# Patient Record
Sex: FEMALE | Race: WHITE | NOT HISPANIC OR LATINO | Employment: OTHER | URBAN - NONMETROPOLITAN AREA
[De-identification: names, ages, dates, MRNs, and addresses within clinical notes are randomized per-mention and may not be internally consistent; named-entity substitution may affect disease eponyms.]

---

## 2017-02-03 ENCOUNTER — TRANSCRIBE ORDERS (OUTPATIENT)
Dept: ADMINISTRATIVE | Facility: HOSPITAL | Age: 72
End: 2017-02-03

## 2017-02-03 ENCOUNTER — APPOINTMENT (OUTPATIENT)
Dept: LAB | Facility: HOSPITAL | Age: 72
End: 2017-02-03
Payer: COMMERCIAL

## 2017-02-03 DIAGNOSIS — R35.0 URINARY FREQUENCY: Primary | ICD-10-CM

## 2017-02-03 DIAGNOSIS — R35.0 URINARY FREQUENCY: ICD-10-CM

## 2017-02-03 PROCEDURE — 87086 URINE CULTURE/COLONY COUNT: CPT

## 2017-02-04 LAB — BACTERIA UR CULT: NORMAL

## 2017-05-08 ENCOUNTER — APPOINTMENT (OUTPATIENT)
Dept: LAB | Facility: HOSPITAL | Age: 72
End: 2017-05-08
Payer: COMMERCIAL

## 2017-05-08 ENCOUNTER — TRANSCRIBE ORDERS (OUTPATIENT)
Dept: ADMINISTRATIVE | Facility: HOSPITAL | Age: 72
End: 2017-05-08

## 2017-05-08 DIAGNOSIS — K21.9 GASTROESOPHAGEAL REFLUX DISEASE, ESOPHAGITIS PRESENCE NOT SPECIFIED: ICD-10-CM

## 2017-05-08 DIAGNOSIS — I10 ESSENTIAL HYPERTENSION, BENIGN: Primary | ICD-10-CM

## 2017-05-08 DIAGNOSIS — I10 ESSENTIAL HYPERTENSION, BENIGN: ICD-10-CM

## 2017-05-08 DIAGNOSIS — E78.5 HYPERLIPIDEMIA, UNSPECIFIED HYPERLIPIDEMIA TYPE: ICD-10-CM

## 2017-05-08 LAB
ALBUMIN SERPL BCP-MCNC: 4 G/DL (ref 3.5–5)
ALP SERPL-CCNC: 91 U/L (ref 46–116)
ALT SERPL W P-5'-P-CCNC: 24 U/L (ref 12–78)
ANION GAP SERPL CALCULATED.3IONS-SCNC: 9 MMOL/L (ref 4–13)
AST SERPL W P-5'-P-CCNC: 17 U/L (ref 5–45)
BACTERIA UR QL AUTO: ABNORMAL /HPF
BASOPHILS # BLD AUTO: 0.04 THOUSANDS/ΜL (ref 0–0.1)
BASOPHILS NFR BLD AUTO: 1 % (ref 0–1)
BILIRUB SERPL-MCNC: 0.3 MG/DL (ref 0.2–1)
BILIRUB UR QL STRIP: NEGATIVE
BUN SERPL-MCNC: 15 MG/DL (ref 5–25)
CALCIUM SERPL-MCNC: 9.2 MG/DL (ref 8.3–10.1)
CHLORIDE SERPL-SCNC: 109 MMOL/L (ref 100–108)
CHOLEST SERPL-MCNC: 185 MG/DL (ref 50–200)
CLARITY UR: CLEAR
CO2 SERPL-SCNC: 28 MMOL/L (ref 21–32)
COLOR UR: YELLOW
CREAT SERPL-MCNC: 0.79 MG/DL (ref 0.6–1.3)
EOSINOPHIL # BLD AUTO: 0.43 THOUSAND/ΜL (ref 0–0.61)
EOSINOPHIL NFR BLD AUTO: 7 % (ref 0–6)
ERYTHROCYTE [DISTWIDTH] IN BLOOD BY AUTOMATED COUNT: 15.6 % (ref 11.6–15.1)
GFR SERPL CREATININE-BSD FRML MDRD: >60 ML/MIN/1.73SQ M
GLUCOSE P FAST SERPL-MCNC: 89 MG/DL (ref 65–99)
GLUCOSE UR STRIP-MCNC: NEGATIVE MG/DL
HCT VFR BLD AUTO: 41.5 % (ref 34.8–46.1)
HDLC SERPL-MCNC: 73 MG/DL (ref 40–60)
HGB BLD-MCNC: 13.1 G/DL (ref 11.5–15.4)
HGB UR QL STRIP.AUTO: ABNORMAL
KETONES UR STRIP-MCNC: NEGATIVE MG/DL
LDLC SERPL CALC-MCNC: 104 MG/DL (ref 0–100)
LEUKOCYTE ESTERASE UR QL STRIP: ABNORMAL
LYMPHOCYTES # BLD AUTO: 2.3 THOUSANDS/ΜL (ref 0.6–4.47)
LYMPHOCYTES NFR BLD AUTO: 37 % (ref 14–44)
MCH RBC QN AUTO: 27 PG (ref 26.8–34.3)
MCHC RBC AUTO-ENTMCNC: 31.6 G/DL (ref 31.4–37.4)
MCV RBC AUTO: 85 FL (ref 82–98)
MONOCYTES # BLD AUTO: 0.48 THOUSAND/ΜL (ref 0.17–1.22)
MONOCYTES NFR BLD AUTO: 8 % (ref 4–12)
NEUTROPHILS # BLD AUTO: 2.92 THOUSANDS/ΜL (ref 1.85–7.62)
NEUTS SEG NFR BLD AUTO: 47 % (ref 43–75)
NITRITE UR QL STRIP: NEGATIVE
NON-SQ EPI CELLS URNS QL MICRO: ABNORMAL /HPF
PH UR STRIP.AUTO: 6 [PH] (ref 4.5–8)
PLATELET # BLD AUTO: 219 THOUSANDS/UL (ref 149–390)
PMV BLD AUTO: 11.4 FL (ref 8.9–12.7)
POTASSIUM SERPL-SCNC: 4.2 MMOL/L (ref 3.5–5.3)
PROT SERPL-MCNC: 7 G/DL (ref 6.4–8.2)
PROT UR STRIP-MCNC: NEGATIVE MG/DL
RBC # BLD AUTO: 4.86 MILLION/UL (ref 3.81–5.12)
RBC #/AREA URNS AUTO: ABNORMAL /HPF
SODIUM SERPL-SCNC: 146 MMOL/L (ref 136–145)
SP GR UR STRIP.AUTO: 1.02 (ref 1–1.03)
TRIGL SERPL-MCNC: 38 MG/DL
UROBILINOGEN UR QL STRIP.AUTO: 0.2 E.U./DL
WBC # BLD AUTO: 6.17 THOUSAND/UL (ref 4.31–10.16)
WBC #/AREA URNS AUTO: ABNORMAL /HPF

## 2017-05-08 PROCEDURE — 80061 LIPID PANEL: CPT

## 2017-05-08 PROCEDURE — 80053 COMPREHEN METABOLIC PANEL: CPT

## 2017-05-08 PROCEDURE — 36415 COLL VENOUS BLD VENIPUNCTURE: CPT

## 2017-05-08 PROCEDURE — 85025 COMPLETE CBC W/AUTO DIFF WBC: CPT

## 2017-05-08 PROCEDURE — 81001 URINALYSIS AUTO W/SCOPE: CPT | Performed by: INTERNAL MEDICINE

## 2018-04-27 ENCOUNTER — HOSPITAL ENCOUNTER (OUTPATIENT)
Dept: RADIOLOGY | Facility: HOSPITAL | Age: 73
Discharge: HOME | End: 2018-04-27
Payer: COMMERCIAL

## 2018-04-27 ENCOUNTER — TRANSCRIBE ORDERS (OUTPATIENT)
Dept: ADMINISTRATIVE | Facility: HOSPITAL | Age: 73
End: 2018-04-27

## 2018-04-27 DIAGNOSIS — M79.641 RIGHT HAND PAIN: Primary | ICD-10-CM

## 2018-04-27 DIAGNOSIS — M79.641 RIGHT HAND PAIN: ICD-10-CM

## 2018-04-27 PROCEDURE — 73130 X-RAY EXAM OF HAND: CPT

## 2019-04-01 ENCOUNTER — APPOINTMENT (OUTPATIENT)
Dept: LAB | Facility: HOSPITAL | Age: 74
End: 2019-04-01
Payer: COMMERCIAL

## 2019-04-01 ENCOUNTER — TRANSCRIBE ORDERS (OUTPATIENT)
Dept: ADMISSIONS | Facility: HOSPITAL | Age: 74
End: 2019-04-01

## 2019-04-01 DIAGNOSIS — E55.9 VITAMIN D DEFICIENCY DISEASE: ICD-10-CM

## 2019-04-01 DIAGNOSIS — M51.36 DEGENERATION OF LUMBAR INTERVERTEBRAL DISC: ICD-10-CM

## 2019-04-01 DIAGNOSIS — E78.00 PURE HYPERCHOLESTEROLEMIA: Primary | ICD-10-CM

## 2019-04-01 DIAGNOSIS — K21.9 GASTROESOPHAGEAL REFLUX DISEASE, ESOPHAGITIS PRESENCE NOT SPECIFIED: ICD-10-CM

## 2019-04-01 DIAGNOSIS — E78.00 PURE HYPERCHOLESTEROLEMIA: ICD-10-CM

## 2019-04-01 LAB
25(OH)D3 SERPL-MCNC: 20.3 NG/ML (ref 30–100)
ALBUMIN SERPL BCP-MCNC: 4.2 G/DL (ref 3.5–5)
ALP SERPL-CCNC: 106 U/L (ref 46–116)
ALT SERPL W P-5'-P-CCNC: 40 U/L (ref 12–78)
ANION GAP SERPL CALCULATED.3IONS-SCNC: 11 MMOL/L (ref 4–13)
AST SERPL W P-5'-P-CCNC: 23 U/L (ref 5–45)
BASOPHILS # BLD AUTO: 0.05 THOUSANDS/ΜL (ref 0–0.1)
BASOPHILS NFR BLD AUTO: 1 % (ref 0–1)
BILIRUB SERPL-MCNC: 0.4 MG/DL (ref 0.2–1)
BUN SERPL-MCNC: 12 MG/DL (ref 5–25)
CALCIUM SERPL-MCNC: 9.5 MG/DL (ref 8.3–10.1)
CHLORIDE SERPL-SCNC: 106 MMOL/L (ref 100–108)
CHOLEST SERPL-MCNC: 181 MG/DL (ref 50–200)
CO2 SERPL-SCNC: 28 MMOL/L (ref 21–32)
CREAT SERPL-MCNC: 0.75 MG/DL (ref 0.6–1.3)
EOSINOPHIL # BLD AUTO: 0.25 THOUSAND/ΜL (ref 0–0.61)
EOSINOPHIL NFR BLD AUTO: 4 % (ref 0–6)
ERYTHROCYTE [DISTWIDTH] IN BLOOD BY AUTOMATED COUNT: 13.3 % (ref 11.6–15.1)
ERYTHROCYTE [SEDIMENTATION RATE] IN BLOOD: 8 MM/HOUR (ref 0–20)
GFR SERPL CREATININE-BSD FRML MDRD: 80 ML/MIN/1.73SQ M
GLUCOSE P FAST SERPL-MCNC: 89 MG/DL (ref 65–99)
HCT VFR BLD AUTO: 43 % (ref 34.8–46.1)
HDLC SERPL-MCNC: 68 MG/DL (ref 40–60)
HGB BLD-MCNC: 13.4 G/DL (ref 11.5–15.4)
IMM GRANULOCYTES # BLD AUTO: 0.01 THOUSAND/UL (ref 0–0.2)
IMM GRANULOCYTES NFR BLD AUTO: 0 % (ref 0–2)
LDLC SERPL CALC-MCNC: 101 MG/DL (ref 0–100)
LYMPHOCYTES # BLD AUTO: 2.31 THOUSANDS/ΜL (ref 0.6–4.47)
LYMPHOCYTES NFR BLD AUTO: 39 % (ref 14–44)
MCH RBC QN AUTO: 28.7 PG (ref 26.8–34.3)
MCHC RBC AUTO-ENTMCNC: 31.2 G/DL (ref 31.4–37.4)
MCV RBC AUTO: 92 FL (ref 82–98)
MONOCYTES # BLD AUTO: 0.46 THOUSAND/ΜL (ref 0.17–1.22)
MONOCYTES NFR BLD AUTO: 8 % (ref 4–12)
NEUTROPHILS # BLD AUTO: 2.91 THOUSANDS/ΜL (ref 1.85–7.62)
NEUTS SEG NFR BLD AUTO: 48 % (ref 43–75)
NONHDLC SERPL-MCNC: 113 MG/DL
NRBC BLD AUTO-RTO: 0 /100 WBCS
PLATELET # BLD AUTO: 221 THOUSANDS/UL (ref 149–390)
PMV BLD AUTO: 11.2 FL (ref 8.9–12.7)
POTASSIUM SERPL-SCNC: 3.8 MMOL/L (ref 3.5–5.3)
PROT SERPL-MCNC: 7.7 G/DL (ref 6.4–8.2)
RBC # BLD AUTO: 4.67 MILLION/UL (ref 3.81–5.12)
SODIUM SERPL-SCNC: 145 MMOL/L (ref 136–145)
TRIGL SERPL-MCNC: 58 MG/DL
WBC # BLD AUTO: 5.99 THOUSAND/UL (ref 4.31–10.16)

## 2019-04-01 PROCEDURE — 36415 COLL VENOUS BLD VENIPUNCTURE: CPT

## 2019-04-01 PROCEDURE — 80061 LIPID PANEL: CPT

## 2019-04-01 PROCEDURE — 80053 COMPREHEN METABOLIC PANEL: CPT

## 2019-04-01 PROCEDURE — 82306 VITAMIN D 25 HYDROXY: CPT

## 2019-04-01 PROCEDURE — 85652 RBC SED RATE AUTOMATED: CPT

## 2019-04-01 PROCEDURE — 85025 COMPLETE CBC W/AUTO DIFF WBC: CPT

## 2019-11-13 ENCOUNTER — HOSPITAL ENCOUNTER (OUTPATIENT)
Dept: RADIOLOGY | Facility: HOSPITAL | Age: 74
Discharge: HOME | End: 2019-11-13
Payer: COMMERCIAL

## 2019-11-13 ENCOUNTER — TRANSCRIBE ORDERS (OUTPATIENT)
Dept: ADMINISTRATIVE | Facility: HOSPITAL | Age: 74
End: 2019-11-13

## 2019-11-13 DIAGNOSIS — M19.90 OTHER TYPE OF OSTEOARTHRITIS, UNSPECIFIED SITE: Primary | ICD-10-CM

## 2019-11-13 DIAGNOSIS — M19.90 OTHER TYPE OF OSTEOARTHRITIS, UNSPECIFIED SITE: ICD-10-CM

## 2019-11-13 PROCEDURE — 73660 X-RAY EXAM OF TOE(S): CPT

## 2020-03-06 ENCOUNTER — APPOINTMENT (OUTPATIENT)
Dept: LAB | Facility: HOSPITAL | Age: 75
End: 2020-03-06
Payer: COMMERCIAL

## 2020-03-06 ENCOUNTER — TRANSCRIBE ORDERS (OUTPATIENT)
Dept: ADMISSIONS | Facility: HOSPITAL | Age: 75
End: 2020-03-06

## 2020-03-06 DIAGNOSIS — E78.00 PURE HYPERCHOLESTEROLEMIA: ICD-10-CM

## 2020-03-06 DIAGNOSIS — I10 ESSENTIAL HYPERTENSION, BENIGN: Primary | ICD-10-CM

## 2020-03-06 DIAGNOSIS — I10 ESSENTIAL HYPERTENSION, BENIGN: ICD-10-CM

## 2020-03-06 DIAGNOSIS — E55.9 VITAMIN D DEFICIENCY DISEASE: ICD-10-CM

## 2020-03-06 DIAGNOSIS — E53.8 VITAMIN B 12 DEFICIENCY: ICD-10-CM

## 2020-03-06 LAB
25(OH)D3 SERPL-MCNC: 19.5 NG/ML (ref 30–100)
ALBUMIN SERPL BCP-MCNC: 4.3 G/DL (ref 3.5–5)
ALP SERPL-CCNC: 106 U/L (ref 46–116)
ALT SERPL W P-5'-P-CCNC: 21 U/L (ref 12–78)
ANION GAP SERPL CALCULATED.3IONS-SCNC: 10 MMOL/L (ref 4–13)
AST SERPL W P-5'-P-CCNC: 18 U/L (ref 5–45)
BACTERIA UR QL AUTO: ABNORMAL /HPF
BASOPHILS # BLD AUTO: 0.06 THOUSANDS/ΜL (ref 0–0.1)
BASOPHILS NFR BLD AUTO: 1 % (ref 0–1)
BILIRUB SERPL-MCNC: 0.5 MG/DL (ref 0.2–1)
BILIRUB UR QL STRIP: NEGATIVE
BUN SERPL-MCNC: 13 MG/DL (ref 5–25)
CALCIUM SERPL-MCNC: 9.6 MG/DL (ref 8.3–10.1)
CHLORIDE SERPL-SCNC: 105 MMOL/L (ref 100–108)
CHOLEST SERPL-MCNC: 186 MG/DL (ref 50–200)
CLARITY UR: ABNORMAL
CO2 SERPL-SCNC: 27 MMOL/L (ref 21–32)
COLOR UR: YELLOW
CREAT SERPL-MCNC: 0.82 MG/DL (ref 0.6–1.3)
EOSINOPHIL # BLD AUTO: 0.1 THOUSAND/ΜL (ref 0–0.61)
EOSINOPHIL NFR BLD AUTO: 2 % (ref 0–6)
ERYTHROCYTE [DISTWIDTH] IN BLOOD BY AUTOMATED COUNT: 12.9 % (ref 11.6–15.1)
ERYTHROCYTE [SEDIMENTATION RATE] IN BLOOD: 8 MM/HOUR (ref 0–20)
GFR SERPL CREATININE-BSD FRML MDRD: 72 ML/MIN/1.73SQ M
GLUCOSE P FAST SERPL-MCNC: 97 MG/DL (ref 65–99)
GLUCOSE UR STRIP-MCNC: NEGATIVE MG/DL
HCT VFR BLD AUTO: 44.9 % (ref 34.8–46.1)
HDLC SERPL-MCNC: 73 MG/DL
HGB BLD-MCNC: 13.8 G/DL (ref 11.5–15.4)
HGB UR QL STRIP.AUTO: ABNORMAL
IMM GRANULOCYTES # BLD AUTO: 0.02 THOUSAND/UL (ref 0–0.2)
IMM GRANULOCYTES NFR BLD AUTO: 0 % (ref 0–2)
KETONES UR STRIP-MCNC: NEGATIVE MG/DL
LDLC SERPL CALC-MCNC: 101 MG/DL (ref 0–100)
LEUKOCYTE ESTERASE UR QL STRIP: NEGATIVE
LYMPHOCYTES # BLD AUTO: 1.54 THOUSANDS/ΜL (ref 0.6–4.47)
LYMPHOCYTES NFR BLD AUTO: 26 % (ref 14–44)
MCH RBC QN AUTO: 28.9 PG (ref 26.8–34.3)
MCHC RBC AUTO-ENTMCNC: 30.7 G/DL (ref 31.4–37.4)
MCV RBC AUTO: 94 FL (ref 82–98)
MONOCYTES # BLD AUTO: 0.39 THOUSAND/ΜL (ref 0.17–1.22)
MONOCYTES NFR BLD AUTO: 7 % (ref 4–12)
NEUTROPHILS # BLD AUTO: 3.91 THOUSANDS/ΜL (ref 1.85–7.62)
NEUTS SEG NFR BLD AUTO: 64 % (ref 43–75)
NITRITE UR QL STRIP: NEGATIVE
NON-SQ EPI CELLS URNS QL MICRO: ABNORMAL /HPF
NONHDLC SERPL-MCNC: 113 MG/DL
NRBC BLD AUTO-RTO: 0 /100 WBCS
PH UR STRIP.AUTO: 6.5 [PH]
PLATELET # BLD AUTO: 222 THOUSANDS/UL (ref 149–390)
PMV BLD AUTO: 10.6 FL (ref 8.9–12.7)
POTASSIUM SERPL-SCNC: 3.8 MMOL/L (ref 3.5–5.3)
PROT SERPL-MCNC: 8.1 G/DL (ref 6.4–8.2)
PROT UR STRIP-MCNC: NEGATIVE MG/DL
RBC # BLD AUTO: 4.77 MILLION/UL (ref 3.81–5.12)
RBC #/AREA URNS AUTO: ABNORMAL /HPF
SODIUM SERPL-SCNC: 142 MMOL/L (ref 136–145)
SP GR UR STRIP.AUTO: <=1.005 (ref 1–1.03)
TRIGL SERPL-MCNC: 58 MG/DL
TSH SERPL DL<=0.05 MIU/L-ACNC: 1.19 UIU/ML (ref 0.36–3.74)
UROBILINOGEN UR QL STRIP.AUTO: 0.2 E.U./DL
VIT B12 SERPL-MCNC: 266 PG/ML (ref 100–900)
WBC # BLD AUTO: 6.02 THOUSAND/UL (ref 4.31–10.16)
WBC #/AREA URNS AUTO: ABNORMAL /HPF

## 2020-03-06 PROCEDURE — 80061 LIPID PANEL: CPT

## 2020-03-06 PROCEDURE — 81001 URINALYSIS AUTO W/SCOPE: CPT | Performed by: INTERNAL MEDICINE

## 2020-03-06 PROCEDURE — 82306 VITAMIN D 25 HYDROXY: CPT

## 2020-03-06 PROCEDURE — 82607 VITAMIN B-12: CPT

## 2020-03-06 PROCEDURE — 36415 COLL VENOUS BLD VENIPUNCTURE: CPT

## 2020-03-06 PROCEDURE — 85652 RBC SED RATE AUTOMATED: CPT

## 2020-03-06 PROCEDURE — 84443 ASSAY THYROID STIM HORMONE: CPT

## 2020-03-06 PROCEDURE — 80053 COMPREHEN METABOLIC PANEL: CPT

## 2020-03-06 PROCEDURE — 85025 COMPLETE CBC W/AUTO DIFF WBC: CPT

## 2020-05-27 ENCOUNTER — TRANSCRIBE ORDERS (OUTPATIENT)
Dept: ADMINISTRATIVE | Facility: HOSPITAL | Age: 75
End: 2020-05-27

## 2020-05-27 ENCOUNTER — APPOINTMENT (OUTPATIENT)
Dept: LAB | Facility: HOSPITAL | Age: 75
End: 2020-05-27
Payer: COMMERCIAL

## 2020-05-27 DIAGNOSIS — I10 ESSENTIAL HYPERTENSION, MALIGNANT: ICD-10-CM

## 2020-05-27 DIAGNOSIS — I10 ESSENTIAL HYPERTENSION, MALIGNANT: Primary | ICD-10-CM

## 2020-05-27 LAB
ANION GAP SERPL CALCULATED.3IONS-SCNC: 10 MMOL/L (ref 4–13)
BUN SERPL-MCNC: 16 MG/DL (ref 5–25)
CALCIUM SERPL-MCNC: 9.5 MG/DL (ref 8.3–10.1)
CHLORIDE SERPL-SCNC: 105 MMOL/L (ref 100–108)
CO2 SERPL-SCNC: 26 MMOL/L (ref 21–32)
CREAT SERPL-MCNC: 0.87 MG/DL (ref 0.6–1.3)
GFR SERPL CREATININE-BSD FRML MDRD: 67 ML/MIN/1.73SQ M
GLUCOSE SERPL-MCNC: 104 MG/DL (ref 65–140)
POTASSIUM SERPL-SCNC: 3.8 MMOL/L (ref 3.5–5.3)
SODIUM SERPL-SCNC: 141 MMOL/L (ref 136–145)
TSH SERPL DL<=0.05 MIU/L-ACNC: 1.06 UIU/ML (ref 0.36–3.74)

## 2020-05-27 PROCEDURE — 80048 BASIC METABOLIC PNL TOTAL CA: CPT

## 2020-05-27 PROCEDURE — 36415 COLL VENOUS BLD VENIPUNCTURE: CPT

## 2020-05-27 PROCEDURE — 84443 ASSAY THYROID STIM HORMONE: CPT

## 2020-07-01 ENCOUNTER — APPOINTMENT (EMERGENCY)
Dept: RADIOLOGY | Facility: HOSPITAL | Age: 75
End: 2020-07-01
Payer: COMMERCIAL

## 2020-07-01 ENCOUNTER — HOSPITAL ENCOUNTER (EMERGENCY)
Facility: HOSPITAL | Age: 75
Discharge: HOME | End: 2020-07-01
Attending: EMERGENCY MEDICINE | Admitting: EMERGENCY MEDICINE
Payer: COMMERCIAL

## 2020-07-01 VITALS
HEART RATE: 68 BPM | WEIGHT: 174.38 LBS | SYSTOLIC BLOOD PRESSURE: 139 MMHG | TEMPERATURE: 98.5 F | RESPIRATION RATE: 18 BRPM | OXYGEN SATURATION: 95 % | DIASTOLIC BLOOD PRESSURE: 76 MMHG

## 2020-07-01 DIAGNOSIS — S60.222A CONTUSION OF LEFT HAND: Primary | ICD-10-CM
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
## 2020-07-01 PROCEDURE — 99284 EMERGENCY DEPT VISIT MOD MDM: CPT | Performed by: STUDENT IN AN ORGANIZED HEALTH CARE EDUCATION/TRAINING PROGRAM

## 2020-07-01 PROCEDURE — 29125 APPL SHORT ARM SPLINT STATIC: CPT | Performed by: STUDENT IN AN ORGANIZED HEALTH CARE EDUCATION/TRAINING PROGRAM

## 2020-07-01 PROCEDURE — 73130 X-RAY EXAM OF HAND: CPT

## 2020-07-01 PROCEDURE — 99283 EMERGENCY DEPT VISIT LOW MDM: CPT

## 2020-07-01 RX ORDER — DICLOFENAC SODIUM 75 MG/1
TABLET, DELAYED RELEASE ORAL
COMMUNITY
Start: 2020-05-26

## 2020-07-01 RX ORDER — CYCLOBENZAPRINE HCL 10 MG
TABLET ORAL
COMMUNITY
Start: 2020-05-08

## 2020-07-01 RX ORDER — PRAVASTATIN SODIUM 40 MG
40 TABLET ORAL DAILY
COMMUNITY
Start: 2020-03-30

## 2020-07-01 RX ORDER — PANTOPRAZOLE SODIUM 40 MG/1
40 TABLET, DELAYED RELEASE ORAL DAILY
COMMUNITY
Start: 2020-05-14

## 2020-07-01 RX ORDER — METOPROLOL SUCCINATE 25 MG/1
TABLET, EXTENDED RELEASE ORAL
COMMUNITY
Start: 2020-05-27

## 2020-07-01 RX ORDER — ALPRAZOLAM 0.25 MG/1
TABLET ORAL
COMMUNITY
Start: 2020-05-26

## 2020-07-01 NOTE — ED ATTENDING ATTESTATION
7/1/2020  I, Skye Campos MD, saw and evaluated the patient  I have discussed the patient with the resident/non-physician practitioner and agree with the resident's/non-physician practitioner's findings, Plan of Care, and MDM as documented in the resident's/non-physician practitioner's note, except where noted  All available labs and Radiology studies were reviewed  I was present for key portions of any procedure(s) performed by the resident/non-physician practitioner and I was immediately available to provide assistance  At this point I agree with the current assessment done in the Emergency Department  I have conducted an independent evaluation of this patient a history and physical is as follows:    74-year-old right-hand-dominant female with past medical history of hypertension and hyperlipidemia who is presenting after a mechanical fall that occurred the night prior to presentation  Patient reports that she was walking on a linoleum floor when her sneaker gripped the floor, causing her to fall forwards  Patient broke her fall with bilateral outstretched hands  Patient reports pain in the dorsum of her left hand and in the left wrist   Pain is 4/10 in intensity and is aching in character  It is worse with movement and palpation  Patient took some Advil last night but has not taken any medications this morning  Patient denies any other injuries  She did not hit her head  The fall was witnessed by her  to confirms that she did not hit her head  Patient takes no antiplatelets or anticoagulants  She has no other complaints on review of systems  On physical examination, the patient is in no acute distress  Vital signs are within normal limits apart from elevated blood pressure  No external signs of trauma  No deformity noted to the left wrist or hand  No ecchymosis or swelling    Tenderness to palpation noted to the dorsal left hand as well as the dorsal left wrist   Tenderness in the anatomic snuffbox  No pain with passive extremity range of motion with the exception of the left hand  Left hand is fully neurovascularly intact  No external signs of head trauma  No cervical spine tenderness  Regular rate and rhythm with no murmurs  Lungs clear bilaterally  Abdomen soft and nontender  Plan:  X-rays of the left hand and wrist   Patient offered analgesics but she declined  Will treat based on results of x-ray  In any case, will plan to place thumb spica splint due to tenderness in the anatomic snuffbox  ED Course  ED Course as of Jul 01 1750 Wed Jul 01, 2020   0955 No acute abnormality  XR hand 3+ views LEFT   1039 Splint placed by resident  X-ray negative for acute injuries  Will place patient in a thumb spica splint secondary to persistent tenderness in the anatomic snuffbox  Will have patient follow-up with Orthopedic Surgery  Patient verbalized understanding of the plan for treatment, need for follow-up, and return precautions        Critical Care Time  Procedures

## 2020-07-01 NOTE — ED NOTES
Splint applied to left arm by dr Gaston Fish   Circulation adequate post application     Benjy Eldridge RN  07/01/20 3198

## 2020-07-01 NOTE — ED PROVIDER NOTES
History  Chief Complaint   Patient presents with    Hand Injury     Last night her sneaker get caught in carpet, fell forward, attempted to stop fall  Complains of left hand pain  HPI:  This is a 68-year-old female presents to the emergency department for left wrist/hand  Patient states last night she suffered a mechanical fall from standing height as her sneaker got caught in the carpet; as result she fell landed on a left outstretched hand  Denies any head neck back pain, denies loss of consciousness, denies hitting her head neck or back  States her only complaint at this time is left hand pain  She denies being on blood thinners, does not report a history of frequent falls  Patient denies any previous injury to the left hand however reports that she has a left elbow prosthesis  Today she is endorsing dull diffuse pain across the dorsal aspect of the left hand which she rates as 4/10 worse with motion and tender to the touch  She reports intact sensation distally and proximally of the injury site  Prior to Admission Medications   Prescriptions Last Dose Informant Patient Reported? Taking?    ALPRAZolam (XANAX) 0 25 mg tablet 6/30/2020 at Unknown time  Yes Yes   cyclobenzaprine (FLEXERIL) 10 mg tablet 6/30/2020 at Unknown time  Yes Yes   Sig: TAKE ONE HALF IN THE AM, ONE AT NOON,AND ONE IN THE PM   diclofenac (VOLTAREN) 75 mg EC tablet   Yes Yes   metoprolol succinate (TOPROL-XL) 25 mg 24 hr tablet 6/30/2020 at Unknown time  Yes Yes   pantoprazole (PROTONIX) 40 mg tablet 6/30/2020 at Unknown time  Yes Yes   Sig: Take 40 mg by mouth daily   pravastatin (PRAVACHOL) 40 mg tablet 6/30/2020 at Unknown time  Yes Yes   Sig: Take 40 mg by mouth daily      Facility-Administered Medications: None       Past Medical History:   Diagnosis Date    GERD (gastroesophageal reflux disease)     Hypertension        Past Surgical History:   Procedure Laterality Date    BREAST SURGERY      HAND SURGERY Left     HEMORRHOID SURGERY      PILONIDAL CYST EXCISION         History reviewed  No pertinent family history  I have reviewed and agree with the history as documented  E-Cigarette/Vaping    E-Cigarette Use Never User      E-Cigarette/Vaping Substances     Social History     Tobacco Use    Smoking status: Never Smoker    Smokeless tobacco: Never Used   Substance Use Topics    Alcohol use: Not Currently    Drug use: Never        Review of Systems   Constitutional: Negative for activity change, appetite change, chills, fatigue and fever  HENT: Negative for congestion, dental problem, drooling, ear discharge, ear pain, facial swelling, postnasal drip, rhinorrhea and sinus pain  Eyes: Negative for photophobia, pain, discharge and itching  Respiratory: Negative for apnea, cough, chest tightness and shortness of breath  Cardiovascular: Negative for chest pain and leg swelling  Gastrointestinal: Negative for abdominal distention, abdominal pain, anal bleeding, constipation, diarrhea and nausea  Endocrine: Negative for cold intolerance, heat intolerance and polydipsia  Genitourinary: Negative for difficulty urinating  Musculoskeletal: Negative for arthralgias, gait problem, joint swelling and myalgias  Left hand pain   Skin: Negative for color change and pallor  Allergic/Immunologic: Negative for immunocompromised state  Neurological: Negative for dizziness, seizures, facial asymmetry, weakness, light-headedness, numbness and headaches  Psychiatric/Behavioral: Negative for agitation, behavioral problems, confusion, decreased concentration and dysphoric mood         Physical Exam  ED Triage Vitals   Temperature Pulse Respirations Blood Pressure SpO2   07/01/20 0920 07/01/20 0920 07/01/20 0920 07/01/20 0920 07/01/20 0920   98 5 °F (36 9 °C) 77 17 167/81 97 %      Temp Source Heart Rate Source Patient Position - Orthostatic VS BP Location FiO2 (%)   07/01/20 0920 07/01/20 0920 07/01/20 0930 07/01/20 0920 --   Temporal Monitor Sitting Right arm       Pain Score       07/01/20 0920       5             Orthostatic Vital Signs  Vitals:    07/01/20 0920 07/01/20 0930 07/01/20 0945 07/01/20 1015   BP: 167/81 167/81 151/73 139/76   Pulse: 77 71 69 68   Patient Position - Orthostatic VS:  Sitting Sitting Sitting       Physical Exam   Constitutional: She is oriented to person, place, and time  She appears well-developed and well-nourished  No distress  HENT:   Head: Normocephalic and atraumatic  Eyes: Pupils are equal, round, and reactive to light  Conjunctivae and EOM are normal    Neck: Normal range of motion  Neck supple  Cardiovascular: Normal rate, regular rhythm and normal heart sounds  Exam reveals no friction rub  No murmur heard  Pulmonary/Chest: Effort normal and breath sounds normal    Abdominal: Soft  Bowel sounds are normal    Musculoskeletal: Normal range of motion  She exhibits no edema  Hands:  Visually hand is unremarkable:  No obvious deformity, overlying skin changes, no swelling, no ecchymosis, no hematoma as, note skin color changes  There is diffuse tenderness across the dorsal aspect of the hand  No crepitus appreciated on examination  She does have intact distal and proximal sensation function and motor  She does have full range of motion of the fingers and wrist joints  No snuffbox tenderness on my exam    Neurological: She is alert and oriented to person, place, and time  She exhibits normal muscle tone  Coordination normal    Skin: Skin is warm  Capillary refill takes less than 2 seconds  Psychiatric: She has a normal mood and affect  Her behavior is normal  Thought content normal        ED Medications  Medications - No data to display    Diagnostic Studies  Results Reviewed     None                 XR hand 3+ views LEFT   Final Result by Rachel Morley MD (07/01 8886)      No acute osseous abnormality        Workstation performed: [id number] Procedures  Splint application  Date/Time: 7/1/2020 10:36 AM  Performed by: Lloyd Webber MD  Authorized by: Lloyd Webber MD     Patient location:  Bedside  Performing Provider:  Resident  Other Assisting Provider: No    Consent:     Consent obtained:  Verbal    Consent given by:  Patient and spouse    Risks discussed:  Numbness, discoloration, pain and swelling    Alternatives discussed:  No treatment and referral  Universal protocol:     Procedure explained and questions answered to patient or proxy's satisfaction: yes      Relevant documents present and verified: yes      Test results available and properly labeled: yes      Radiology Images displayed and confirmed  If images not available, report reviewed: yes      Required blood products, implants, devices, and special equipment available: yes      Site/side marked: yes      Immediately prior to procedure a time out was called: yes      Patient identity confirmed:  Verbally with patient, arm band and provided demographic data  Indication:     Indications comment:  Snuff box tenderness  Pre-procedure details:     Sensation:  Normal  Procedure details:     Laterality:  Left    Location:  Hand    Hand:  L hand    Strapping: no      Splint type:  Thumb spica    Supplies:  Ortho-Glass  Post-procedure details:     Pain:  Unchanged    Sensation:  Normal    Neurovascular Exam: skin pink, capillary refill <2 sec, normal pulses and skin intact, warm, and dry      Patient tolerance of procedure: Tolerated well, no immediate complications          ED Course  ED Course as of Jul 01 1037   Wed Jul 01, 2020   7538 Radiology films reviewed with Dr Sharath Finn radiology, no acute pathology/fractures noted          US AUDIT      Most Recent Value   Initial Alcohol Screen: US AUDIT-C    1  How often do you have a drink containing alcohol?  0 Filed at: 07/01/2020 0933   2  How many drinks containing alcohol do you have on a typical day you are drinking?    0 Filed at: 07/01/2020 0933   3b  FEMALE Any Age, or MALE 65+: How often do you have 4 or more drinks on one occassion? 0 Filed at: 07/01/2020 0933   Audit-C Score  0 Filed at: 07/01/2020 0661                  MATTEO/DAST-10      Most Recent Value   How many times in the past year have you    Used an illegal drug or used a prescription medication for non-medical reasons? Never Filed at: 07/01/2020 5763                              [facility]  Number of Diagnoses or Management Options  Diagnosis management comments: Given mechanism concerns for musculoskeletal injury, with comorbidities of age concerns for fractures will x-ray hands  I do not believe there is any indication to do any other imaging studies, or workup       Amount and/or Complexity of Data Reviewed  Clinical lab tests: ordered  Tests in the radiology section of CPT®: ordered  Tests in the medicine section of CPT®: ordered  Discussion of test results with the performing providers: yes  Decide to obtain previous medical records or to obtain history from someone other than the patient: yes  Review and summarize past medical records: yes  Discuss the patient with other providers: yes  Independent visualization of images, tracings, or specimens: yes    Risk of Complications, Morbidity, and/or Mortality  Presenting problems: low  Diagnostic procedures: low  Management options: low    Patient Progress  Patient progress: stable        Disposition  Final diagnoses:   Contusion of left hand   Fall, initial encounter     Time reflects when diagnosis was documented in both MDM as applicable and the Disposition within this note     Time User Action Codes Description Comment    7/1/2020  9:49 AM Jackie Jarrett [S60 222A] Contusion of left hand     7/1/2020  9:49 AM Jackie Jarrett [A76  Evelyn Bae, initial encounter       ED Disposition     ED Disposition Condition Date/Time Comment    Discharge Stable Wed Jul 1, 2020  9:55 AM Jen Tai discharge to home/self care             Follow-up Information     Follow up With Specialties Details Why [address], MD Internal Medicine In 1 week  [address]  [phone]      Rolando Coates MD Orthopedic Surgery In 1 week  [facility]  [address]  [phone]            Patient's Medications   Discharge Prescriptions    No medications on file     No discharge procedures on file  PDMP Review     None           ED Provider  Attending physically available and evaluated Sally Camacho I managed the patient along with the ED Attending      Electronically Signed by         Gricel Negron MD  07/01/20 1037

## 2020-07-08 ENCOUNTER — OFFICE VISIT (OUTPATIENT)
Dept: OBGYN CLINIC | Facility: CLINIC | Age: 75
End: 2020-07-08
Payer: COMMERCIAL

## 2020-07-08 VITALS
BODY MASS INDEX: 29.06 KG/M2 | HEART RATE: 68 BPM | WEIGHT: 164 LBS | SYSTOLIC BLOOD PRESSURE: 131 MMHG | TEMPERATURE: 98.2 F | DIASTOLIC BLOOD PRESSURE: 84 MMHG | HEIGHT: 63 IN

## 2020-07-08 DIAGNOSIS — M19.042 ARTHRITIS OF LEFT HAND: ICD-10-CM

## 2020-07-08 DIAGNOSIS — S60.152A CONTUSION OF LEFT LITTLE FINGER WITH DAMAGE TO NAIL, INITIAL ENCOUNTER: Primary | ICD-10-CM

## 2020-07-08 PROCEDURE — 99203 OFFICE O/P NEW LOW 30 MIN: CPT | Performed by: PHYSICIAN ASSISTANT

## 2020-07-08 NOTE — PROGRESS NOTES
70 y o female presents for follow-up of left hand injury she is right-hand dominant  On June 30th she tripped and fell and hit her hand  She was seen emergency room the next day  X-rays were taken which were read as negative  She is placed in a splint referred to Orthopedics for follow-up  She denies any numbness or tingling  Review of Systems  Review of systems negative unless otherwise specified in HPI    Past Medical History  Past Medical History:   Diagnosis Date    GERD (gastroesophageal reflux disease)     Hypertension      Past Surgical History  Past Surgical History:   Procedure Laterality Date    BREAST SURGERY      HAND SURGERY Left     HEMORRHOID SURGERY      PILONIDAL CYST EXCISION         Current Medications  Current Outpatient Medications on File Prior to Visit   Medication Sig Dispense Refill    ALPRAZolam (XANAX) 0 25 mg tablet       cyclobenzaprine (FLEXERIL) 10 mg tablet TAKE ONE HALF IN THE AM, ONE AT NOON,AND ONE IN THE PM      diclofenac (VOLTAREN) 75 mg EC tablet       metoprolol succinate (TOPROL-XL) 25 mg 24 hr tablet       pantoprazole (PROTONIX) 40 mg tablet Take 40 mg by mouth daily      pravastatin (PRAVACHOL) 40 mg tablet Take 40 mg by mouth daily       No current facility-administered medications on file prior to visit  Recent Labs [facility])  0   Lab Value Date/Time    HCT 44 9 03/06/2020 0754    HCT 42 5 06/11/2015 0755    HGB 13 8 03/06/2020 0754    HGB 14 3 06/11/2015 0755    WBC 6 02 03/06/2020 0754    WBC 6 34 06/11/2015 0755    ESR 8 03/06/2020 0754    GLUCOSE 83 06/11/2015 0756     Physical exam  Body mass index is 29 05 kg/m²  · General: Awake, Alert, Oriented  · Eyes: Pupils equal, round and reactive to light  · Heart: regular rate and rhythm  · Lungs: No audible wheezing  · Abdomen: soft  left hand:   Resolving ecchymosis over the dorsum of the left 5th metacarpal midshaft region  There is no outward deformity    There is no rotational deformity  She has negative squeeze test the forearm  She is able open and close her fingers   strength testing left 4+/5 right 5/5  She is able oppose her thumb to all fingers  She has brisk capillary refill  Mild tenderness at the 1st [facility] joint  She has slight her regions nodes  Sensation intact  Procedure: none    Imaging  I reviewed previous x-rays from the ER agree with radiologist's reading of no obvious fracture dislocation  1  Contusion of left little finger with damage to nail, initial encounter    2  Arthritis of left hand      Assessment:  left hand contusion with multiple areas of arthritis  Plan: We will place the patient in a removable DME wrist brace  She is to wear this for comfort but removed to work on gentle range of motion prove also start the patient in occupational therapy  See the patient back in 4 weeks for follow-up  She may use ice as needed for discomfort 20 minutes 3 to 4 times a day  Also Tylenol or ibuprofen as needed  Activity as tolerated with the hand  This note was created with voice recognition software

## 2020-07-08 NOTE — PATIENT INSTRUCTIONS
We will place the patient in a removable DME wrist brace  She is to wear this for comfort but removed to work on gentle range of motion prove also start the patient in occupational therapy  See the patient back in 4 weeks for follow-up  She may use ice as needed for discomfort 20 minutes 3 to 4 times a day  Also Tylenol or ibuprofen as needed  Activity as tolerated with the hand

## 2020-07-20 ENCOUNTER — EVALUATION (OUTPATIENT)
Dept: OCCUPATIONAL THERAPY | Facility: HOME HEALTHCARE | Age: 75
End: 2020-07-20
Payer: COMMERCIAL

## 2020-07-20 DIAGNOSIS — S60.152D CONTUSION OF LEFT LITTLE FINGER WITH DAMAGE TO NAIL, SUBSEQUENT ENCOUNTER: Primary | ICD-10-CM

## 2020-07-20 PROCEDURE — 97110 THERAPEUTIC EXERCISES: CPT

## 2020-07-20 PROCEDURE — 97165 OT EVAL LOW COMPLEX 30 MIN: CPT

## 2020-07-20 NOTE — PROGRESS NOTES
OT Evaluation     Today's date: 2020  Patient name: Tra Simons  : 1945  MRN: [id number]  Referring provider: Rosita Tolbert PA-C  Dx:   Encounter Diagnosis     ICD-10-CM    1  Contusion of left little finger with damage to nail, subsequent encounter S60 152D        Start Time: 1600  Stop Time: 1655  Total time in clinic (min): 55 minutes    Assessment  Assessment details: rTa Record is a 70 y o  female with a diagnosis of contusion of left little finger and wrist  A low complexity evaluation was completed today  The FOTO score is 67% as patient is having difficulty with weight bearing, opening jar lids, doing household chores, and lifting heavy shopping bag/purse   Findings show an impairment with ROM, strength, activity tolerance, and pain which limits completion of ADL's/IADL's  Patient will benefit from OT services to reach goals  Impairments: abnormal or restricted ROM, activity intolerance, impaired physical strength, lacks appropriate home exercise program, pain with function and weight-bearing intolerance    Symptom irritability: lowUnderstanding of Dx/Px/POC: good   Prognosis: good    Goals  STG's In 2 weeks:  1  Patient will report a decreased pain level of 3/10   2  Patient will improve AROM of left wrist flex to 55, ext to 35, UD/RD to 13, MCP flex D2 and 3 to 85   3   Patient will increase strength of left  to 23#, lat pinch to 11#, tip pinch to 5#, wrist to 4-/5 for lifting and carrying items  4  Patient will demonstrate good carryover and independence with HEP     LTG's In 4 weeks:  1  Patient will report a decreased pain level of 1/10 in left wrist   2   Patient will improve AROM of left wrist flex to 65, ext to 45, UD/RD to 20, supination to 85 for self-care tasks  3  Patient will increase strength of left  to 27#, lat pinch to 13#, 2 point pinch to 7#, wrist flex/ext to 4/5 for lifting and carrying items        Plan  Patient would benefit from: OT eval and skilled occupational therapy  Planned modality interventions: thermotherapy: hydrocollator packs, thermotherapy: paraffin bath, cryotherapy and ultrasound  Planned therapy interventions: patient education, manual therapy, joint mobilization, therapeutic exercise, home exercise program, functional ROM exercises and strengthening  Frequency: 2x week  Duration in visits: 8  Duration in weeks: 4  Plan of Care beginning date: 2020  Plan of Care expiration date: 2020  Treatment plan discussed with: patient        Subjective Evaluation    History of Present Illness  Date of onset: 2020  Mechanism of injury: 70 y  o female presents for follow-up of left hand injury she is right-hand dominant  On  she tripped and fell and hit her hand  She was seen emergency room the next day  X-rays were taken which were read as negative  She is placed in a splint referred to Orthopedics for follow-up  She denies any numbness or tingling    Quality of life: good    Pain  Current pain ratin  At best pain ratin  At worst pain ratin  Location: left wrist  Quality: dull ache  Relieving factors: ice, rest and support  Aggravating factors: lifting and overhead activity    Social Support  Lives with: spouse    Employment status: not working  Hand dominance: right      Diagnostic Tests  X-ray: normal  Treatments  Current treatment: occupational therapy  Patient Goals  Patient goals for therapy: increased strength, independence with ADLs/IADLs, decreased pain, increased motion and decreased edema          Objective     Active Range of Motion     Left Wrist   Wrist flexion: 45 degrees   Wrist extension: 29 degrees   Radial deviation: 10 degrees   Ulnar deviation: 8 degrees     Right Wrist   Normal active range of motion    Left Thumb   Radial abduction    CMC: 80 degrees  Opposition: intact    Right Thumb   Radial Abduction    CMC: 80    Left Digits    Flexion   Index     MCP: 65  Middle     MCP: 65    Additional Active Range of Motion Details  Left forearm Supination 64, 3/5 strength  Pronation 90, 3+/5 strength  All PIP's flex WFL's    Strength/Myotome Testing     Left Wrist/Hand   Wrist extension: 3+  Wrist flexion: 3  Radial deviation: 3+  Ulnar deviation: 3+     (2nd hand position)     Trial 1: 15    Trial 2: 20    Thumb Strength  Key/Lateral Pinch     Trail 1: 9  Tip/Two-Point Pinch     Trial 1: 3    Right Wrist/Hand   Normal wrist strength     (2nd hand position)     Trial 1: 15    Trial 2: 17    Thumb Strength   Key/Lateral Pinch     Trial 1: 10  Tip/Two-Point Pinch     Trial 1: 4      Flowsheet Rows      Most Recent Value   PT/OT G-Codes   Current Score  67   Projected Score  79                Precautions N/A     Manuals 7/20/20       PROM left wrist all planes  MCP/PIP flex/ext                                Neuro Re-Ed                                                                 Ther Ex        AROM w/ stretch  Wrist flex/ext  UD/RD  supination Hold 10 sec    5/5  5/5  5/5               T-putty                                                Ther Activity                                                Modalities

## 2020-07-22 ENCOUNTER — OFFICE VISIT (OUTPATIENT)
Dept: OCCUPATIONAL THERAPY | Facility: HOME HEALTHCARE | Age: 75
End: 2020-07-22
Payer: COMMERCIAL

## 2020-07-22 DIAGNOSIS — S60.152D CONTUSION OF LEFT LITTLE FINGER WITH DAMAGE TO NAIL, SUBSEQUENT ENCOUNTER: Primary | ICD-10-CM

## 2020-07-22 PROCEDURE — 97110 THERAPEUTIC EXERCISES: CPT

## 2020-07-22 PROCEDURE — 97140 MANUAL THERAPY 1/> REGIONS: CPT

## 2020-07-22 NOTE — PROGRESS NOTES
Daily Note     Today's date: 2020  Patient name: Ashley Thompson  : 1945  MRN: [id number]  Referring provider: Curry Amin PA-C  Dx:   Encounter Diagnosis     ICD-10-CM    1  Contusion of left little finger with damage to nail, subsequent encounter S60 623D                   Subjective: " I don't wear my wrist brace for sleeping "      Objective: See treatment diary below      Assessment: Tolerated treatment well  Tightness noted with wrist extension and MCP flexion of D2 and D3  Patient would benefit from continued OT      Plan: Continue per plan of care  Precautions N/A   RE-EVAL: 20, Date of injury: 20  Manuals 20      PROM left wrist all planes  supination  MCP/PIP   flex/ext  30 min  Neuro Re-Ed                                                                 Ther Ex        AROM w/ stretch  Wrist flex/ext  UD/RD  supination Hold 10 sec    5/5  5/5  5/5 Hold 10 sec      3/3  3/3      Prayer stretch  Hold 15 sec  4x      T-putty        Gripper    Yellow  10 x 2      digi buttons  Red thumb 10x  Yellow D2-5 10      Flex bar  pronation  Yellow   20                      Ther Activity                                                Modalities

## 2020-07-27 ENCOUNTER — APPOINTMENT (OUTPATIENT)
Dept: OCCUPATIONAL THERAPY | Facility: HOME HEALTHCARE | Age: 75
End: 2020-07-27
Payer: COMMERCIAL

## 2020-07-29 ENCOUNTER — OFFICE VISIT (OUTPATIENT)
Dept: OCCUPATIONAL THERAPY | Facility: HOME HEALTHCARE | Age: 75
End: 2020-07-29
Payer: COMMERCIAL

## 2020-07-29 DIAGNOSIS — S60.152D CONTUSION OF LEFT LITTLE FINGER WITH DAMAGE TO NAIL, SUBSEQUENT ENCOUNTER: Primary | ICD-10-CM

## 2020-07-29 PROCEDURE — 97110 THERAPEUTIC EXERCISES: CPT

## 2020-07-29 PROCEDURE — 97140 MANUAL THERAPY 1/> REGIONS: CPT

## 2020-07-29 NOTE — PROGRESS NOTES
Daily Note     Today's date: 2020  Patient name: Jassi Leger  : 1945  MRN: [id number]  Referring provider: Marlene Bettencourt PA-C  Dx:   Encounter Diagnosis     ICD-10-CM    1  Contusion of left little finger with damage to nail, subsequent encounter S60 152D                   Subjective: Patient had no complaints      Objective: See treatment diary below      Assessment: Tolerated treatment well  Tightness evident with PROM of MCP and PIP's  HEP was reviewed and given to patient and her   Patient would benefit from continued OT      Plan: Continue per plan of care  Precautions N/A   RE-EVAL: 20, Date of injury: 20  Manuals 20     PROM left wrist all planes  supination  MCP/PIP   flex/ext  30 min  15 min  Neuro Re-Ed                                                                 Ther Ex        AROM w/ stretch  Wrist flex/ext  UD/RD  supination Hold 10 sec    5/5  5/5  5/5 Hold 10 sec      3/3  3/3 Hold 10 sec    5/5  5/5     Prayer stretch  Hold 15 sec  4x Hold 15 sec 4x     T-putty        Gripper    indiv digits  Yellow  10 x 2 Yellow   10 x 2  10     digi buttons  Red thumb 10x  Yellow D2-5 10 Red 10x     Flex bar  Pronation/sup  Flex/ext  Yellow   20 Yellow   10/10  10/10                     Ther Activity                                                Modalities

## 2020-08-03 ENCOUNTER — OFFICE VISIT (OUTPATIENT)
Dept: OCCUPATIONAL THERAPY | Facility: HOME HEALTHCARE | Age: 75
End: 2020-08-03
Payer: COMMERCIAL

## 2020-08-03 DIAGNOSIS — S60.152D CONTUSION OF LEFT LITTLE FINGER WITH DAMAGE TO NAIL, SUBSEQUENT ENCOUNTER: Primary | ICD-10-CM

## 2020-08-03 PROCEDURE — 97140 MANUAL THERAPY 1/> REGIONS: CPT

## 2020-08-03 PROCEDURE — 97110 THERAPEUTIC EXERCISES: CPT

## 2020-08-03 NOTE — PROGRESS NOTES
Daily Note     Today's date: 8/3/2020  Patient name: Franco Mendez  : 1945  MRN: [id number]  Referring provider: Harvis Ahumada, PA-C  Dx:   Encounter Diagnosis     ICD-10-CM    1  Contusion of left little finger with damage to nail, subsequent encounter  S60 152D                   Subjective: " I have about a 5/10 pain "      Objective: See treatment diary below      Assessment: Tolerated treatment well  Patient's strength with 2 point pinch, supination, and wrist flexion have improved  Also, Active ROM has increased with wrist extension and supination  Tightness was found in MCP's for flexion  Pain was increased at end range of wrist flexion/extension  Patient would benefit from continued OT      Plan: Continue per plan of care  Precautions N/A   RE-EVAL: 20, Date of injury: 20  Manuals 7/20/20 7/22/20 7/29/20 8/3/2020    PROM left wrist all planes  supination  MCP/PIP   flex/ext  30 min  15 min  15 min  Neuro Re-Ed                                                                 Ther Ex        AROM w/ stretch  Wrist flex/ext  UD/RD  supination Hold 10 sec    5/5  5/5  5/5 Hold 10 sec      3/3  3/3 Hold 10 sec    5/5  5/5 Hold 10 sec    5/5    5/5    Prayer stretch  Hold 15 sec  4x Hold 15 sec 4x Hold 15 sec 4x    T-putty        Gripper    indiv digits  Yellow  10 x 2 Yellow   10 x 2  10 Yellow, red   10 x 3  10    digi buttons  Red thumb 10x  Yellow D2-5 10 Red 10x Red  10x  D1-5    Flex bar  Pronation/sup  Flex/ext  Yellow   20 Yellow   10/10  10/10 Yellow  10/10  10/10    Finger ext    Red 10            Ther Activity                                                Modalities

## 2020-08-05 ENCOUNTER — OFFICE VISIT (OUTPATIENT)
Dept: OBGYN CLINIC | Facility: CLINIC | Age: 75
End: 2020-08-05
Payer: COMMERCIAL

## 2020-08-05 VITALS
TEMPERATURE: 98.8 F | SYSTOLIC BLOOD PRESSURE: 130 MMHG | DIASTOLIC BLOOD PRESSURE: 85 MMHG | WEIGHT: 164.7 LBS | HEIGHT: 63 IN | HEART RATE: 79 BPM | BODY MASS INDEX: 29.18 KG/M2

## 2020-08-05 DIAGNOSIS — S60.152D CONTUSION OF LEFT LITTLE FINGER WITH DAMAGE TO NAIL, SUBSEQUENT ENCOUNTER: Primary | ICD-10-CM

## 2020-08-05 PROCEDURE — 99212 OFFICE O/P EST SF 10 MIN: CPT | Performed by: ORTHOPAEDIC SURGERY

## 2020-08-05 NOTE — PATIENT INSTRUCTIONS
As the patient is doing better and does not want any further intervention, we will discharge patient from this point time and have her seen on a p r n  Basis  She may transition out of her wrist brace over the next few weeks but wear for activities that cause any discomfort  She should remove this to work on range of motion do home exercise program   She will be discontinuing physical therapy the end of this week  Tylenol as needed

## 2020-08-05 NOTE — PROGRESS NOTES
70 y o female presents for follow-up of her left hand contusion  She notes she has occasional discomfort  She rates her high his pain level a 5/10  She denies any numbness or tingling  She states the pain is over the dorsum of the hand  She has been going to physical therapy  She states she is able to do the things she wants to is not restricted by pain  Review of Systems  Review of systems negative unless otherwise specified in HPI    Past Medical History  Past Medical History:   Diagnosis Date    GERD (gastroesophageal reflux disease)     Hypertension      Past Surgical History  Past Surgical History:   Procedure Laterality Date    BREAST SURGERY      HAND SURGERY Left     HEMORRHOID SURGERY      PILONIDAL CYST EXCISION       Current Medications  Current Outpatient Medications on File Prior to Visit   Medication Sig Dispense Refill    ALPRAZolam (XANAX) 0 25 mg tablet       cyclobenzaprine (FLEXERIL) 10 mg tablet TAKE ONE HALF IN THE AM, ONE AT NOON,AND ONE IN THE PM      diclofenac (VOLTAREN) 75 mg EC tablet       metoprolol succinate (TOPROL-XL) 25 mg 24 hr tablet       pantoprazole (PROTONIX) 40 mg tablet Take 40 mg by mouth daily      pravastatin (PRAVACHOL) 40 mg tablet Take 40 mg by mouth daily       No current facility-administered medications on file prior to visit  Recent Labs [facility])  0   Lab Value Date/Time    HCT 44 9 03/06/2020 0754    HCT 42 5 06/11/2015 0755    HGB 13 8 03/06/2020 0754    HGB 14 3 06/11/2015 0755    WBC 6 02 03/06/2020 0754    WBC 6 34 06/11/2015 0755    ESR 8 03/06/2020 0754    GLUCOSE 83 06/11/2015 0756     Physical exam  There is no height or weight on file to calculate BMI  · General: Awake, Alert, Oriented  · Eyes: Pupils equal, round and reactive to light  · Heart: regular rate and rhythm  · Lungs: No audible wheezing  · Abdomen: soft  left hand: There is no gross deformity to the forearm wrist or hand area    There is no ecchymosis  There is no swelling  She has full range of motion of the wrist in all planes  She has equal  strength  She has equal wrist flexion and extension as well as ulnar and radial deviation strength  Capillary refill is brisk less than 3 seconds  Radial pulse 4/4  He has no pain with ulnar deviation passively  Which she has discomfort is with resisted wrist extension is over the hand dorsally  There are no tenderness clicks or signs of tenosynovitis  Procedure  None    Imaging  None as patient refused    1  Contusion of left little finger with damage to nail, subsequent encounter      Assessment:  left hand contusion improved    Plan:  As the patient is doing better and does not want any further intervention, we will discharge patient from this point time and have her seen on a p r n  Basis  She may transition out of her wrist brace over the next few weeks but wear for activities that cause any discomfort  She should remove this to work on range of motion do home exercise program   She will be discontinuing physical therapy the end of this week  Tylenol as needed  This note was created with voice recognition software

## 2020-08-06 ENCOUNTER — OFFICE VISIT (OUTPATIENT)
Dept: OCCUPATIONAL THERAPY | Facility: HOME HEALTHCARE | Age: 75
End: 2020-08-06
Payer: COMMERCIAL

## 2020-08-06 DIAGNOSIS — S60.152D CONTUSION OF LEFT LITTLE FINGER WITH DAMAGE TO NAIL, SUBSEQUENT ENCOUNTER: Primary | ICD-10-CM

## 2020-08-06 PROCEDURE — 97110 THERAPEUTIC EXERCISES: CPT

## 2020-08-06 PROCEDURE — 97530 THERAPEUTIC ACTIVITIES: CPT

## 2020-08-06 NOTE — PROGRESS NOTES
Daily Note     Today's date: 2020  Patient name: Alfonso Jenkins  : 1945  MRN: [id number]  Referring provider: Sarina Desir PA-C  Dx:   Encounter Diagnosis     ICD-10-CM    1  Contusion of left little finger with damage to nail, subsequent encounter  S60 152D                   Subjective: I have to tell ya, I talked to the assistant, and told him I did not need the X-Ray that it was feeling better  Objective: See treatment diary below      Assessment: Tolerated treatment well  Patient would benefit from continued OT Pt presents this date with  present for session encouraging pt to complete session and sets throughout  Pt somewhat resistive to completing exercises  Pt states she refused a second X ray due to not having any pain and not wanting to waste the Dr  Time  Pt hopes to be finished therapy after this session  Upon watching pt complete exercises, therapist encouraging pt to come to therapy for more sessions to continue to build strength and ROM in order to safely complete functional activities at home  Pt taking increased time to complete cotton ball activity  Therapist trying to keep pts attention throughout session with new activities and increased difficulty from previous session  Pt trying to leave early several times throughout session, with  trying to make her stay  Therapist providing verbal and tacticle cues for form and safety throughout session  Therapist scheduling pt out until next re-eval to discuss future plans with therapist        Plan: Continue per plan of care  Progress treatment as tolerated  Progress treament per protocol  Precautions N/A   RE-EVAL: 20, Date of injury: 20  Manuals  7/22/20 7/29/20 8/3/2020 8/6/20   PROM left wrist all planes  supination  MCP/PIP   flex/ext  30 min  15 min  15 min                              Neuro Re-Ed                                                                 Ther Ex        AROM w/ stretch  Wrist flex/ext    UD/RD  supination  Hold 10 sec      3/3    3/3 Hold 10 sec    5/5    5/5 Hold 10 sec    5/5    5/5     1# x 10 Sub x 10  1# x 10  1# x 10   Prayer stretch  Hold 15 sec  4x Hold 15 sec 4x Hold 15 sec 4x    T-putty        Gripper    indiv digits  Yellow  10 x 2 Yellow   10 x 2  10 Yellow, red   10 x 3  10 Red  X 10   digi buttons  Red thumb 10x  Yellow D2-5 10 Red 10x Red  10x  D1-5    Flex bar  Pronation/sup  Flex/ext  Yellow   20 Yellow   10/10  10/10 Yellow  10/10  10/10 Red  10/10  10/10   Finger ext    Red 10    Opposition     All digits x 5                                   Ther Activity        Cotton Ball     Red pin D1-3 half Yellow pin D1/4/5 half in bucket                                   Modalities

## 2020-08-10 ENCOUNTER — OFFICE VISIT (OUTPATIENT)
Dept: OCCUPATIONAL THERAPY | Facility: HOME HEALTHCARE | Age: 75
End: 2020-08-10
Payer: COMMERCIAL

## 2020-08-10 DIAGNOSIS — S60.152D CONTUSION OF LEFT LITTLE FINGER WITH DAMAGE TO NAIL, SUBSEQUENT ENCOUNTER: Primary | ICD-10-CM

## 2020-08-10 PROCEDURE — 97110 THERAPEUTIC EXERCISES: CPT

## 2020-08-10 NOTE — PROGRESS NOTES
OT RE-EVALUATION    Today's date: 8/10/2020  Patient name: Ashley Thompson  : 1945  MRN: [id number]  Referring provider: Curry Amin PA-C  Dx:   Encounter Diagnosis     ICD-10-CM    1  Contusion of left little finger with damage to nail, subsequent encounter  S60 152D                   Assessment  Assessment details: Ashley Thompson is a 70 y o  female with a diagnosis of contusion of left little finger and wrist  A low complexity evaluation was completed today  The FOTO score is 67% as patient is having difficulty with weight bearing, opening jar lids, doing household chores, and lifting heavy shopping bag/purse   Findings show an impairment with ROM, strength, activity tolerance, and pain which limits completion of ADL's/IADL's  Patient will benefit from OT services to reach goals  REASSESSMENT (8/10/20): Patient was seen for 6 treatment sessions  FOTO score is 55%  Patient reports having difficulty with weight bearing, opening jar lids, doing household chores, and lifting heavy shopping bag/purse  Improvement noted with AROM of wrist extension and UD  Also, , pinch, and wrist strength have improved  Patient would benefit from continued OT services  Patient continues to be educated on HEP  She would like to be discharged soon  Impairments: abnormal or restricted ROM, activity intolerance, impaired physical strength, lacks appropriate home exercise program, pain with function and weight-bearing intolerance    Symptom irritability: lowUnderstanding of Dx/Px/POC: good   Prognosis: good    Goals  STG's In 2 weeks:  1  Patient will report a decreased pain level of 3/10  MET  2  Patient will improve AROM of left wrist flex to 55, ext to 35, UD/RD to 13, MCP flex D2 and 3 to 85  PARTIALLY MET  3  Patient will increase strength of left  to 23#, lat pinch to 11#, tip pinch to 5#, wrist to 4-/5 for lifting and carrying items  PARTIALLY MET  4   Patient will demonstrate good carryover and independence with HEP  MET    LTG's In 4 weeks:  1  Patient will report a decreased pain level of 1/10 in left wrist  NOT MET  2  Patient will improve AROM of left wrist flex to 65, ext to 45, UD/RD to 20, supination to 85 for self-care tasks NOT MET  3  Patient will increase strength of left  to 27#, lat pinch to 13#, 2 point pinch to 7#, wrist flex/ext to 4/5 for lifting and carrying items  NOT MET      Plan  Patient would benefit from: OT eval and skilled occupational therapy  Planned modality interventions: thermotherapy: hydrocollator packs, cryotherapy and ultrasound  Planned therapy interventions: patient education, manual therapy, joint mobilization, therapeutic exercise, home exercise program, functional ROM exercises and strengthening  Frequency: 2x week  Duration in visits: 8  Duration in weeks: 4  Plan of Care beginning date: 8/10/2020  Plan of Care expiration date: 9/10/2020  Treatment plan discussed with: patient        Subjective Evaluation    History of Present Illness  Date of onset: 2020  Mechanism of injury: 70 y  o female presents for follow-up of left hand injury she is right-hand dominant  On  she tripped and fell and hit her hand  She was seen emergency room the next day  X-rays were taken which were read as negative  She is placed in a splint referred to Orthopedics for follow-up  She denies any numbness or tingling    Quality of life: good    Pain  Current pain ratin  At best pain ratin  At worst pain ratin  Location: left wrist  Quality: dull ache  Relieving factors: ice, rest and support  Aggravating factors: lifting and overhead activity    Social Support  Lives with: spouse    Employment status: not working  Hand dominance: right      Diagnostic Tests  X-ray: normal  Treatments  Current treatment: occupational therapy  Patient Goals  Patient goals for therapy: increased strength, independence with ADLs/IADLs, decreased pain, increased motion and decreased edema          Objective     Active Range of Motion     Left Wrist   Wrist flexion: 70 degrees   Wrist extension: 50 degrees   Radial deviation: 10 degrees   Ulnar deviation: 19 degrees     Right Wrist   Normal active range of motion    Left Thumb   Radial abduction    CMC: 80 degrees  Opposition: intact    Right Thumb   Radial Abduction    CMC: 80    Left Digits    Flexion   Index     MCP: 65  Middle     MCP: 65  Ring     MCP: 65  Little     MCP: 70    Additional Active Range of Motion Details  Left forearm Supination 80 (was 64), 3/5 strength  Pronation 90, 3+/5 strength  All PIP's flex WFL's    Strength/Myotome Testing     Left Wrist/Hand   Wrist extension: 4-  Wrist flexion: 4  Radial deviation: 4-  Ulnar deviation: 4-     (2nd hand position)     Trial 1: 19    Trial 2: 20    Thumb Strength  Key/Lateral Pinch     Trial 1: 10  Tip/Two-Point Pinch     Trial 1: 6    Right Wrist/Hand   Normal wrist strength     (2nd hand position)     Trial 1: 15    Trial 2: 17    Thumb Strength   Key/Lateral Pinch     Trial 1: 10  Tip/Two-Point Pinch     Trial 1: 4                Precautions N/A   RE-EVAL: 8/20/20, Date of injury: 6/30/20  Manuals 8/10/20 7/22/20 7/29/20 8/3/2020 8/6/20   PROM left wrist all planes  supination  MCP/PIP   flex/ext  30 min  15 min  15 min  Neuro Re-Ed                                                                 Ther Ex        AROM w/ stretch  Wrist flex/ext    UD/RD  supination  Hold 10 sec      3/3    3/3 Hold 10 sec    5/5    5/5 Hold 10 sec    5/5    5/5     1# x 10 Sub x 10  1# x 10  1# x 10   Prayer stretch Hold 15 sec  4x Hold 15 sec  4x Hold 15 sec 4x Hold 15 sec 4x    T-putty        Gripper    indiv digits  Yellow  10 x 2 Yellow   10 x 2  10 Yellow, red   10 x 3  10 Red  X 10   digi buttons  Red thumb 10x  Yellow D2-5 10 Red 10x Red  10x  D1-5    Flex bar  Pronation/sup  Flex/ext  Yellow   20 Yellow   10/10  10/10 Yellow  10/10  10/10 Red  10/10  10/10 Finger ext    Red 10    Opposition     All digits x 5   Self-stretch  MCP D2-4  UD/RD  Wrist flex/ext  supination HEP   15 min                        reassessment done       Ther Activity        Cotton Ball     Red pin D1-3 half Yellow pin D1/4/5 half in bucket                                   Modalities

## 2020-08-13 ENCOUNTER — APPOINTMENT (OUTPATIENT)
Dept: OCCUPATIONAL THERAPY | Facility: HOME HEALTHCARE | Age: 75
End: 2020-08-13
Payer: COMMERCIAL

## 2020-08-18 ENCOUNTER — APPOINTMENT (OUTPATIENT)
Dept: OCCUPATIONAL THERAPY | Facility: HOME HEALTHCARE | Age: 75
End: 2020-08-18
Payer: COMMERCIAL

## 2020-09-08 NOTE — PROGRESS NOTES
Patient was seen for 5 treatment sessions  Patient was last treated on 8/10/20  Patient has not returned for OT treatment  Patient is to be discharged from OT services

## 2020-12-24 ENCOUNTER — LAB (OUTPATIENT)
Dept: LAB | Facility: HOSPITAL | Age: 75
End: 2020-12-24
Payer: COMMERCIAL

## 2020-12-24 ENCOUNTER — TRANSCRIBE ORDERS (OUTPATIENT)
Dept: ADMINISTRATIVE | Facility: HOSPITAL | Age: 75
End: 2020-12-24

## 2020-12-24 DIAGNOSIS — E55.9 VITAMIN D DEFICIENCY DISEASE: ICD-10-CM

## 2020-12-24 DIAGNOSIS — I10 MALIGNANT HYPERTENSION: ICD-10-CM

## 2020-12-24 DIAGNOSIS — E78.00 PURE HYPERCHOLESTEROLEMIA: ICD-10-CM

## 2020-12-24 DIAGNOSIS — I10 MALIGNANT HYPERTENSION: Primary | ICD-10-CM

## 2020-12-24 DIAGNOSIS — E53.8 BIOTIN-(PROPIONYL-COA-CARBOXYLASE) LIGASE DEFICIENCY: ICD-10-CM

## 2020-12-24 LAB
25(OH)D3 SERPL-MCNC: 32.8 NG/ML (ref 30–100)
ALBUMIN SERPL BCP-MCNC: 3.9 G/DL (ref 3.5–5)
ALP SERPL-CCNC: 104 U/L (ref 46–116)
ALT SERPL W P-5'-P-CCNC: 23 U/L (ref 12–78)
ANION GAP SERPL CALCULATED.3IONS-SCNC: 9 MMOL/L (ref 4–13)
AST SERPL W P-5'-P-CCNC: 20 U/L (ref 5–45)
BASOPHILS # BLD AUTO: 0.06 THOUSANDS/ΜL (ref 0–0.1)
BASOPHILS NFR BLD AUTO: 1 % (ref 0–1)
BILIRUB SERPL-MCNC: 0.4 MG/DL (ref 0.2–1)
BILIRUB UR QL STRIP: NEGATIVE
BUN SERPL-MCNC: 12 MG/DL (ref 5–25)
CALCIUM SERPL-MCNC: 9.2 MG/DL (ref 8.3–10.1)
CHLORIDE SERPL-SCNC: 107 MMOL/L (ref 100–108)
CHOLEST SERPL-MCNC: 179 MG/DL (ref 50–200)
CLARITY UR: CLEAR
CO2 SERPL-SCNC: 27 MMOL/L (ref 21–32)
COLOR UR: YELLOW
CREAT SERPL-MCNC: 0.73 MG/DL (ref 0.6–1.3)
EOSINOPHIL # BLD AUTO: 0.45 THOUSAND/ΜL (ref 0–0.61)
EOSINOPHIL NFR BLD AUTO: 8 % (ref 0–6)
ERYTHROCYTE [DISTWIDTH] IN BLOOD BY AUTOMATED COUNT: 13.1 % (ref 11.6–15.1)
GFR SERPL CREATININE-BSD FRML MDRD: 82 ML/MIN/1.73SQ M
GLUCOSE P FAST SERPL-MCNC: 95 MG/DL (ref 65–99)
GLUCOSE UR STRIP-MCNC: NEGATIVE MG/DL
HCT VFR BLD AUTO: 41.1 % (ref 34.8–46.1)
HDLC SERPL-MCNC: 66 MG/DL
HGB BLD-MCNC: 13 G/DL (ref 11.5–15.4)
HGB UR QL STRIP.AUTO: NEGATIVE
IMM GRANULOCYTES # BLD AUTO: 0.01 THOUSAND/UL (ref 0–0.2)
IMM GRANULOCYTES NFR BLD AUTO: 0 % (ref 0–2)
KETONES UR STRIP-MCNC: NEGATIVE MG/DL
LDLC SERPL CALC-MCNC: 100 MG/DL (ref 0–100)
LEUKOCYTE ESTERASE UR QL STRIP: NEGATIVE
LYMPHOCYTES # BLD AUTO: 2.2 THOUSANDS/ΜL (ref 0.6–4.47)
LYMPHOCYTES NFR BLD AUTO: 39 % (ref 14–44)
MAGNESIUM SERPL-MCNC: 2.3 MG/DL (ref 1.6–2.6)
MCH RBC QN AUTO: 29.7 PG (ref 26.8–34.3)
MCHC RBC AUTO-ENTMCNC: 31.6 G/DL (ref 31.4–37.4)
MCV RBC AUTO: 94 FL (ref 82–98)
MONOCYTES # BLD AUTO: 0.45 THOUSAND/ΜL (ref 0.17–1.22)
MONOCYTES NFR BLD AUTO: 8 % (ref 4–12)
NEUTROPHILS # BLD AUTO: 2.5 THOUSANDS/ΜL (ref 1.85–7.62)
NEUTS SEG NFR BLD AUTO: 44 % (ref 43–75)
NITRITE UR QL STRIP: NEGATIVE
NONHDLC SERPL-MCNC: 113 MG/DL
NRBC BLD AUTO-RTO: 0 /100 WBCS
PH UR STRIP.AUTO: 7.5 [PH]
PLATELET # BLD AUTO: 204 THOUSANDS/UL (ref 149–390)
PMV BLD AUTO: 10.4 FL (ref 8.9–12.7)
POTASSIUM SERPL-SCNC: 3.7 MMOL/L (ref 3.5–5.3)
PROT SERPL-MCNC: 7.2 G/DL (ref 6.4–8.2)
PROT UR STRIP-MCNC: NEGATIVE MG/DL
RBC # BLD AUTO: 4.38 MILLION/UL (ref 3.81–5.12)
SODIUM SERPL-SCNC: 143 MMOL/L (ref 136–145)
SP GR UR STRIP.AUTO: 1.01 (ref 1–1.03)
TRIGL SERPL-MCNC: 65 MG/DL
TSH SERPL DL<=0.05 MIU/L-ACNC: 1.41 UIU/ML (ref 0.36–3.74)
UROBILINOGEN UR QL STRIP.AUTO: 0.2 E.U./DL
VIT B12 SERPL-MCNC: 405 PG/ML (ref 100–900)
WBC # BLD AUTO: 5.67 THOUSAND/UL (ref 4.31–10.16)

## 2020-12-24 PROCEDURE — 85025 COMPLETE CBC W/AUTO DIFF WBC: CPT

## 2020-12-24 PROCEDURE — 82306 VITAMIN D 25 HYDROXY: CPT

## 2020-12-24 PROCEDURE — 80061 LIPID PANEL: CPT

## 2020-12-24 PROCEDURE — 81003 URINALYSIS AUTO W/O SCOPE: CPT

## 2020-12-24 PROCEDURE — 36415 COLL VENOUS BLD VENIPUNCTURE: CPT

## 2020-12-24 PROCEDURE — 83735 ASSAY OF MAGNESIUM: CPT

## 2020-12-24 PROCEDURE — 80053 COMPREHEN METABOLIC PANEL: CPT

## 2020-12-24 PROCEDURE — 84443 ASSAY THYROID STIM HORMONE: CPT

## 2020-12-24 PROCEDURE — 82607 VITAMIN B-12: CPT

## 2021-02-15 ENCOUNTER — HOSPITAL ENCOUNTER (EMERGENCY)
Facility: HOSPITAL | Age: 76
Discharge: HOME | End: 2021-02-15
Attending: EMERGENCY MEDICINE | Admitting: EMERGENCY MEDICINE
Payer: COMMERCIAL

## 2021-02-15 ENCOUNTER — APPOINTMENT (EMERGENCY)
Dept: RADIOLOGY | Facility: HOSPITAL | Age: 76
End: 2021-02-15
Payer: COMMERCIAL

## 2021-02-15 VITALS
OXYGEN SATURATION: 98 % | BODY MASS INDEX: 29.23 KG/M2 | HEIGHT: 63 IN | SYSTOLIC BLOOD PRESSURE: 135 MMHG | HEART RATE: 65 BPM | DIASTOLIC BLOOD PRESSURE: 78 MMHG | RESPIRATION RATE: 16 BRPM | TEMPERATURE: 99.1 F | WEIGHT: 165 LBS

## 2021-02-15 DIAGNOSIS — R05.9 COUGH: ICD-10-CM

## 2021-02-15 DIAGNOSIS — R06.00 DYSPNEA: Primary | ICD-10-CM

## 2021-02-15 LAB
ALBUMIN SERPL BCP-MCNC: 4 G/DL (ref 3.5–5)
ALP SERPL-CCNC: 87 U/L (ref 46–116)
ALT SERPL W P-5'-P-CCNC: 25 U/L (ref 12–78)
ANION GAP SERPL CALCULATED.3IONS-SCNC: 10 MMOL/L (ref 4–13)
APTT PPP: 26 SECONDS (ref 23–37)
AST SERPL W P-5'-P-CCNC: 15 U/L (ref 5–45)
BASOPHILS # BLD AUTO: 0.07 THOUSANDS/ΜL (ref 0–0.1)
BASOPHILS NFR BLD AUTO: 1 % (ref 0–1)
BILIRUB SERPL-MCNC: 0.5 MG/DL (ref 0.2–1)
BUN SERPL-MCNC: 10 MG/DL (ref 5–25)
CALCIUM SERPL-MCNC: 9.4 MG/DL (ref 8.3–10.1)
CHLORIDE SERPL-SCNC: 107 MMOL/L (ref 100–108)
CO2 SERPL-SCNC: 26 MMOL/L (ref 21–32)
CREAT SERPL-MCNC: 0.83 MG/DL (ref 0.6–1.3)
D DIMER PPP FEU-MCNC: 0.27 UG/ML FEU
EOSINOPHIL # BLD AUTO: 0.18 THOUSAND/ΜL (ref 0–0.61)
EOSINOPHIL NFR BLD AUTO: 3 % (ref 0–6)
ERYTHROCYTE [DISTWIDTH] IN BLOOD BY AUTOMATED COUNT: 13 % (ref 11.6–15.1)
FLUAV RNA RESP QL NAA+PROBE: NEGATIVE
FLUBV RNA RESP QL NAA+PROBE: NEGATIVE
GFR SERPL CREATININE-BSD FRML MDRD: 70 ML/MIN/1.73SQ M
GLUCOSE SERPL-MCNC: 96 MG/DL (ref 65–140)
HCT VFR BLD AUTO: 39.6 % (ref 34.8–46.1)
HGB BLD-MCNC: 12.7 G/DL (ref 11.5–15.4)
IMM GRANULOCYTES # BLD AUTO: 0.02 THOUSAND/UL (ref 0–0.2)
IMM GRANULOCYTES NFR BLD AUTO: 0 % (ref 0–2)
INR PPP: 1.05 (ref 0.84–1.19)
LYMPHOCYTES # BLD AUTO: 2.14 THOUSANDS/ΜL (ref 0.6–4.47)
LYMPHOCYTES NFR BLD AUTO: 32 % (ref 14–44)
MAGNESIUM SERPL-MCNC: 2.2 MG/DL (ref 1.6–2.6)
MCH RBC QN AUTO: 29.8 PG (ref 26.8–34.3)
MCHC RBC AUTO-ENTMCNC: 32.1 G/DL (ref 31.4–37.4)
MCV RBC AUTO: 93 FL (ref 82–98)
MONOCYTES # BLD AUTO: 0.49 THOUSAND/ΜL (ref 0.17–1.22)
MONOCYTES NFR BLD AUTO: 7 % (ref 4–12)
NEUTROPHILS # BLD AUTO: 3.74 THOUSANDS/ΜL (ref 1.85–7.62)
NEUTS SEG NFR BLD AUTO: 57 % (ref 43–75)
NRBC BLD AUTO-RTO: 0 /100 WBCS
NT-PROBNP SERPL-MCNC: 274 PG/ML
PLATELET # BLD AUTO: 214 THOUSANDS/UL (ref 149–390)
PMV BLD AUTO: 10.7 FL (ref 8.9–12.7)
POTASSIUM SERPL-SCNC: 3.4 MMOL/L (ref 3.5–5.3)
PROCALCITONIN SERPL-MCNC: <0.05 NG/ML
PROT SERPL-MCNC: 7.2 G/DL (ref 6.4–8.2)
PROTHROMBIN TIME: 13.5 SECONDS (ref 11.6–14.5)
RBC # BLD AUTO: 4.26 MILLION/UL (ref 3.81–5.12)
RSV RNA RESP QL NAA+PROBE: NEGATIVE
SARS-COV-2 RNA RESP QL NAA+PROBE: NEGATIVE
SODIUM SERPL-SCNC: 143 MMOL/L (ref 136–145)
TROPONIN I SERPL-MCNC: <0.02 NG/ML
WBC # BLD AUTO: 6.64 THOUSAND/UL (ref 4.31–10.16)

## 2021-02-15 PROCEDURE — 83735 ASSAY OF MAGNESIUM: CPT | Performed by: PHYSICIAN ASSISTANT

## 2021-02-15 PROCEDURE — 85025 COMPLETE CBC W/AUTO DIFF WBC: CPT | Performed by: PHYSICIAN ASSISTANT

## 2021-02-15 PROCEDURE — 93005 ELECTROCARDIOGRAM TRACING: CPT

## 2021-02-15 PROCEDURE — 84484 ASSAY OF TROPONIN QUANT: CPT | Performed by: PHYSICIAN ASSISTANT

## 2021-02-15 PROCEDURE — 84145 PROCALCITONIN (PCT): CPT | Performed by: PHYSICIAN ASSISTANT

## 2021-02-15 PROCEDURE — 85730 THROMBOPLASTIN TIME PARTIAL: CPT | Performed by: PHYSICIAN ASSISTANT

## 2021-02-15 PROCEDURE — 71045 X-RAY EXAM CHEST 1 VIEW: CPT

## 2021-02-15 PROCEDURE — 80053 COMPREHEN METABOLIC PANEL: CPT | Performed by: PHYSICIAN ASSISTANT

## 2021-02-15 PROCEDURE — 36415 COLL VENOUS BLD VENIPUNCTURE: CPT | Performed by: PHYSICIAN ASSISTANT

## 2021-02-15 PROCEDURE — 85610 PROTHROMBIN TIME: CPT | Performed by: PHYSICIAN ASSISTANT

## 2021-02-15 PROCEDURE — 0241U HB NFCT DS VIR RESP RNA 4 TRGT: CPT | Performed by: PHYSICIAN ASSISTANT

## 2021-02-15 PROCEDURE — 85379 FIBRIN DEGRADATION QUANT: CPT | Performed by: PHYSICIAN ASSISTANT

## 2021-02-15 PROCEDURE — 99285 EMERGENCY DEPT VISIT HI MDM: CPT

## 2021-02-15 PROCEDURE — 99284 EMERGENCY DEPT VISIT MOD MDM: CPT | Performed by: PHYSICIAN ASSISTANT

## 2021-02-15 PROCEDURE — 83880 ASSAY OF NATRIURETIC PEPTIDE: CPT | Performed by: PHYSICIAN ASSISTANT

## 2021-02-15 RX ORDER — BENZONATATE 100 MG/1
100 CAPSULE ORAL 3 TIMES DAILY PRN
Qty: 20 CAPSULE | Refills: 0 | Status: SHIPPED | OUTPATIENT
Start: 2021-02-15

## 2021-02-15 RX ORDER — ALBUTEROL SULFATE 90 UG/1
1-2 AEROSOL, METERED RESPIRATORY (INHALATION) EVERY 4 HOURS PRN
Qty: 1 INHALER | Refills: 0 | Status: SHIPPED | OUTPATIENT
Start: 2021-02-15

## 2021-02-15 NOTE — DISCHARGE INSTRUCTIONS
Rest, plenty of fluids  Albuterol inhaler every 4-6 hours as needed for cough/wheezing/shortness of breath  Tessalon as needed for cough  Return to ER if you have worsening symptoms, chest pain, fever or any other concerns  Follow up with PCP or return to ER as needed

## 2021-02-15 NOTE — ED PROVIDER NOTES
History  Chief Complaint   Patient presents with    Shortness of Breath     pt reports sob that started approx 2 days ago,  was seen by pcp today and was told to come to ED     70year old female presents with spouse from home for evaluation of shortness of breath  Pt notes she was sick about 2 weeks ago, with cough, congestion, sore throat  She had called her PCP who had placed her on a zpack and medrol pack  She completed these last week with no change in symptoms  She notes over the past 2-3 days has been having shortness of breath and chest pain  Pain located anterior chest, only present when taking a deep breath, does not radiate  She called PCP today who referred her to the ED  She reports a dry, nonproductive cough  Denies fever, chills  Denies N/V/D, abdominal pain  Denies dizziness or lightheadedness  Denies sick contacts  Denies recent travel  No known exposure to covid  No reported alleviating factors  PMH includes HTN and GERD  Pt is a nonsmoker  Pt reports she called her doctor and was referred to be evaluated, especially to rule out covid infection  History provided by:  Patient   used: No    Shortness of Breath  Worsened by:  Deep breathing  Associated symptoms: chest pain and cough    Associated symptoms: no abdominal pain, no ear pain, no fever, no headaches, no neck pain, no rash, no sore throat, no sputum production, no syncope, no vomiting and no wheezing    Risk factors: no hx of cancer, no hx of PE/DVT, no recent surgery and no tobacco use        Prior to Admission Medications   Prescriptions Last Dose Informant Patient Reported? Taking?    ALPRAZolam (XANAX) 0 25 mg tablet   Yes No   cyclobenzaprine (FLEXERIL) 10 mg tablet   Yes No   Sig: TAKE ONE HALF IN THE AM, ONE AT NOON,AND ONE IN THE PM   diclofenac (VOLTAREN) 75 mg EC tablet   Yes No   metoprolol succinate (TOPROL-XL) 25 mg 24 hr tablet   Yes No   pantoprazole (PROTONIX) 40 mg tablet   Yes No Sig: Take 40 mg by mouth daily   pravastatin (PRAVACHOL) 40 mg tablet   Yes No   Sig: Take 40 mg by mouth daily      Facility-Administered Medications: None       Past Medical History:   Diagnosis Date    GERD (gastroesophageal reflux disease)     Hypertension        Past Surgical History:   Procedure Laterality Date    BREAST SURGERY      HAND SURGERY Left     HEMORRHOID SURGERY      PILONIDAL CYST EXCISION         History reviewed  No pertinent family history  I have reviewed and agree with the history as documented  E-Cigarette/Vaping    E-Cigarette Use Never User      E-Cigarette/Vaping Substances     Social History     Tobacco Use    Smoking status: Never Smoker    Smokeless tobacco: Never Used   Substance Use Topics    Alcohol use: Not Currently    Drug use: Never       Review of Systems   Constitutional: Negative  Negative for chills, fatigue and fever  HENT: Negative  Negative for congestion, ear pain, rhinorrhea and sore throat  Eyes: Negative  Negative for visual disturbance  Respiratory: Positive for cough and shortness of breath  Negative for sputum production and wheezing  Cardiovascular: Positive for chest pain  Negative for palpitations, leg swelling and syncope  Gastrointestinal: Negative  Negative for abdominal pain, constipation, diarrhea, nausea and vomiting  Genitourinary: Negative  Negative for dysuria, flank pain, frequency and hematuria  Musculoskeletal: Positive for back pain  Negative for myalgias and neck pain  Skin: Negative  Negative for rash  Neurological: Negative  Negative for dizziness, light-headedness and headaches  Psychiatric/Behavioral: Negative  Negative for confusion  All other systems reviewed and are negative  Physical Exam  Physical Exam  Vitals signs and nursing note reviewed  Constitutional:       General: She is not in acute distress  Appearance: She is well-developed  She is not toxic-appearing     HENT: Head: Normocephalic and atraumatic  Right Ear: Hearing, tympanic membrane, ear canal and external ear normal       Left Ear: Hearing, tympanic membrane, ear canal and external ear normal       Nose: Nose normal       Mouth/Throat:      Mouth: Mucous membranes are moist       Pharynx: Oropharynx is clear  Uvula midline  No oropharyngeal exudate  Eyes:      General: No scleral icterus  Extraocular Movements: Extraocular movements intact  Conjunctiva/sclera: Conjunctivae normal       Pupils: Pupils are equal, round, and reactive to light  Neck:      Musculoskeletal: Normal range of motion and neck supple  Trachea: Trachea and phonation normal  No tracheal deviation  Cardiovascular:      Rate and Rhythm: Normal rate and regular rhythm  Pulses: Normal pulses  Heart sounds: Normal heart sounds, S1 normal and S2 normal  No murmur  Pulmonary:      Effort: Pulmonary effort is normal  No tachypnea or respiratory distress  Breath sounds: Normal breath sounds  No wheezing, rhonchi or rales  Comments: No conversational dyspnea  Abdominal:      General: Bowel sounds are normal  There is no distension  Palpations: Abdomen is soft  Tenderness: There is no abdominal tenderness  There is no guarding or rebound  Musculoskeletal: Normal range of motion  General: No tenderness  Right lower leg: She exhibits no tenderness  No edema  Left lower leg: She exhibits no tenderness  No edema  Skin:     General: Skin is warm and dry  Capillary Refill: Capillary refill takes less than 2 seconds  Findings: No rash  Neurological:      General: No focal deficit present  Mental Status: She is alert and oriented to person, place, and time  GCS: GCS eye subscore is 4  GCS verbal subscore is 5  GCS motor subscore is 6  Cranial Nerves: No cranial nerve deficit  Sensory: No sensory deficit  Motor: No abnormal muscle tone        Gait: Gait normal    Psychiatric:         Mood and Affect: Mood normal          Speech: Speech normal          Behavior: Behavior normal          Vital Signs  ED Triage Vitals [02/15/21 1128]   Temperature Pulse Respirations Blood Pressure SpO2   99 1 °F (37 3 °C) 75 18 165/79 98 %      Temp Source Heart Rate Source Patient Position - Orthostatic VS BP Location FiO2 (%)   Temporal Monitor Sitting Left arm --      Pain Score       No Pain           Vitals:    02/15/21 1128 02/15/21 1200 02/15/21 1345   BP: 165/79 143/69 135/78   Pulse: 75 63 65   Patient Position - Orthostatic VS: Sitting Sitting Sitting         Visual Acuity      ED Medications  Medications - No data to display    Diagnostic Studies  Results Reviewed     Procedure Component Value Units Date/Time    NT-BNP PRO [[id number]]  (Abnormal) Collected: 02/15/21 1258    Lab Status: Final result Specimen: Blood from Arm, Left Updated: 02/15/21 1326     NT-proBNP 274 pg/mL     Magnesium [[id number]]  (Normal) Collected: 02/15/21 1258    Lab Status: Final result Specimen: Blood from Arm, Left Updated: 02/15/21 1326     Magnesium 2 2 mg/dL     Comprehensive metabolic panel [[id number]]  (Abnormal) Collected: 02/15/21 1258    Lab Status: Final result Specimen: Blood from Arm, Left Updated: 02/15/21 1323     Sodium 143 mmol/L      Potassium 3 4 mmol/L      Chloride 107 mmol/L      CO2 26 mmol/L      ANION GAP 10 mmol/L      BUN 10 mg/dL      Creatinine 0 83 mg/dL      Glucose 96 mg/dL      Calcium 9 4 mg/dL      AST 15 U/L      ALT 25 U/L      Alkaline Phosphatase 87 U/L      Total Protein 7 2 g/dL      Albumin 4 0 g/dL      Total Bilirubin 0 50 mg/dL      eGFR 70 ml/min/1 73sq m     Narrative:      [facility] guidelines for Chronic Kidney Disease (CKD):     Stage 1 with normal or high GFR (GFR > 90 mL/min/1 73 square meters)    Stage 2 Mild CKD (GFR = 60-89 mL/min/1 73 square meters)    Stage 3A Moderate CKD (GFR = 45-59 mL/min/1 73 square meters)   Stage 3B Moderate CKD (GFR = 30-44 mL/min/1 73 square meters)    Stage 4 Severe CKD (GFR = 15-29 mL/min/1 73 square meters)    Stage 5 End Stage CKD (GFR <15 mL/min/1 73 square meters)  Note: GFR calculation is accurate only with a steady state creatinine    Troponin I [[id number]]  (Normal) Collected: 02/15/21 1258    Lab Status: Final result Specimen: Blood from Arm, Left Updated: 02/15/21 1322     Troponin I <0 02 ng/mL     COVID19, Influenza A/B, RSV PCR, [facility] [[id number]]  (Normal) Collected: 02/15/21 1231    Lab Status: Final result Specimen: Nasopharyngeal Swab Updated: 02/15/21 1319     SARS-CoV-2 Negative     INFLUENZA A PCR Negative     INFLUENZA B PCR Negative     RSV PCR Negative    Narrative: This test has been authorized by FDA under an EUA (Emergency Use Assay) for use by authorized laboratories  Clinical caution and judgement should be used with the interpretation of these results with consideration of the clinical impression and other laboratory testing  Testing reported as "Positive" or "Negative" has been proven to be accurate according to standard laboratory validation requirements  All testing is performed with control materials showing appropriate reactivity at standard intervals  Procalcitonin with AM Reflex [[id number]] Collected: 02/15/21 1258    Lab Status:  In process Specimen: Blood from Arm, Left Updated: 02/15/21 1310    D-dimer, quantitative [[id number]]  (Normal) Collected: 02/15/21 1231    Lab Status: Final result Specimen: Blood from Arm, Left Updated: 02/15/21 1250     D-Dimer, Quant 0 27 ug/ml FEU     Protime-INR [[id number]]  (Normal) Collected: 02/15/21 1231    Lab Status: Final result Specimen: Blood from Arm, Left Updated: 02/15/21 1248     Protime 13 5 seconds      INR 1 05    APTT [[id number]]  (Normal) Collected: 02/15/21 1231    Lab Status: Final result Specimen: Blood from Arm, Left Updated: 02/15/21 1248     PTT 26 seconds     CBC and differential [[id number]] Collected: 02/15/21 1231    Lab Status: Final result Specimen: Blood from Arm, Left Updated: 02/15/21 1237     WBC 6 64 Thousand/uL      RBC 4 26 Million/uL      Hemoglobin 12 7 g/dL      Hematocrit 39 6 %      MCV 93 fL      MCH 29 8 pg      MCHC 32 1 g/dL      RDW 13 0 %      MPV 10 7 fL      Platelets 977 Thousands/uL      nRBC 0 /100 WBCs      Neutrophils Relative 57 %      Immat GRANS % 0 %      Lymphocytes Relative 32 %      Monocytes Relative 7 %      Eosinophils Relative 3 %      Basophils Relative 1 %      Neutrophils Absolute 3 74 Thousands/µL      Immature Grans Absolute 0 02 Thousand/uL      Lymphocytes Absolute 2 14 Thousands/µL      Monocytes Absolute 0 49 Thousand/µL      Eosinophils Absolute 0 18 Thousand/µL      Basophils Absolute 0 07 Thousands/µL                  XR chest 1 view portable   Final Result by Aleshia De La Vega MD (02/15 1337)   Development of retrocardiac opacity, likely hiatal hernia      No acute cardiopulmonary disease  Workstation performed: [id number]                    Procedures  ECG 12 Lead Documentation Only    Date/Time: 2/15/2021 11:38 AM  Performed by: Ilya Guevara PA-C  Authorized by: Ilya Guevara PA-C     Indications / Diagnosis:  Dyspnea  ECG reviewed by me, the ED Provider: yes    Patient location:  ED  Previous ECG:     Previous ECG:  Unavailable    Comparison to cardiac monitor: Yes    Interpretation:     Interpretation: abnormal    Rate:     ECG rate:  67    ECG rate assessment: normal    Rhythm:     Rhythm: sinus rhythm    Ectopy:     Ectopy: none    QRS:     QRS axis:  Normal    QRS intervals:  Normal  Conduction:     Conduction: normal    ST segments:     ST segments:  Non-specific  T waves:     T waves: non-specific    Comments:      , QRS 86, QT/QTc 368/388; no prior EKG for comparison  ED Course  ED Course as of Feb 15 1546   Mon Feb 15, 2021   1204 Will check EKG, CXR, labs and covid swab    Pt in no respiratory distress, normal O2 saturation % on room air  She completed outpatient course of zithromax and medrol pack without change in symptoms  1255 WBC: 6 64   1255 Hemoglobin: 12 7   1255 Platelet Count: 046   1255 INR: 1 05   1255 Protime: 13 5   1255 PTT: 26   1255 D-Dimer, Quant: 0 27   1324 Glucose, Random: 96   1324 Creatinine: 0 83   1324 BUN: 10   1324 Sodium: 143   1324 Potassium(!): 3 4   1324 Chloride: 107   1324 CO2: 26   1324 Anion Gap: 10   1324 Calcium: 9 4   1324 AST: 15   1324 ALT: 25   1324 Alkaline Phosphatase: 87   1324 Total Protein: 7 2   1324 Albumin: 4 0   1324 TOTAL BILIRUBIN: 0 50   1324 eGFR: 70   1324 Troponin I: <0 02   1324 SARS-COV-2: Negative   1324 INFLU A PCR: Negative   1324 INFLU B PCR: Negative   1324 RSV PCR: Negative   1331 Magnesium: 2 2   1331 NT-proBNP(!): 274   1336 Pt and spouse updated on all results  She reports feeling improved  I feel pt clinically has had a course of bronchitis given recent respiratory illness  Spouse thinks she has anxiety  She is requesting discharge home  She already completely Abx and steroid course, nothing further needed in this regards  Will discharge with albuterol inhaler as needed and tessalon for cough  18 IMPRESSION:  Development of retrocardiac opacity, likely hiatal hernia     No acute cardiopulmonary disease  XR chest 1 view portable     Pt has no difficultly ambulating  No dyspnea noted with doing so  Discussed continued symptomatic/supportive care  Advised rest, fluids, OTC meds as needed for symptoms  Rx albuterol inhaler as needed, tessalon PRN cough  No further Abx therapy needed  Pt already completed course of steroids as well  Strict return precautions outlined  Advised outpatient follow up with PCP for recheck or return to ER for change in condition as outlined  Pt and spouse verbalized understanding and had no further questions          HEART Risk Score      Most Recent Value   Heart Score Risk Calculator   History  0 Filed at: 02/15/2021 1203   ECG  1 Filed at: 02/15/2021 1203   Age  2 Filed at: 02/15/2021 1203   Risk Factors  1 Filed at: 02/15/2021 1203   Troponin  0 Filed at: 02/15/2021 1203   HEART Score  4 Filed at: 02/15/2021 1203                      SBIRT 22yo+      Most Recent Value   SBIRT (25 yo +)   In order to provide better care to our patients, we are screening all of our patients for alcohol and drug use  Would it be okay to ask you these screening questions? Yes Filed at: 02/15/2021 1333   Initial Alcohol Screen: US AUDIT-C    1  How often do you have a drink containing alcohol?  0 Filed at: 02/15/2021 1333   2  How many drinks containing alcohol do you have on a typical day you are drinking? 0 Filed at: 02/15/2021 1333   3a  Male UNDER 65: How often do you have five or more drinks on one occasion? 0 Filed at: 02/15/2021 1333   3b  FEMALE Any Age, or MALE 65+: How often do you have 4 or more drinks on one occassion? 0 Filed at: 02/15/2021 1333   Audit-C Score  0 Filed at: 02/15/2021 1333   MATTEO: How many times in the past year have you    Used an illegal drug or used a prescription medication for non-medical reasons?   Never Filed at: 02/15/2021 1333          Wells' Criteria for PE      Most Recent Value   Wells' Criteria for PE   Clinical signs and symptoms of DVT  0 Filed at: 02/15/2021 1203   PE is primary diagnosis or equally likely  3 Filed at: 02/15/2021 1203   HR >100  0 Filed at: 02/15/2021 1203   Immobilization at least 3 days or Surgery in the previous 4 weeks  0 Filed at: 02/15/2021 1203   Previous, objectively diagnosed PE or DVT  0 Filed at: 02/15/2021 1203   Hemoptysis  0 Filed at: 02/15/2021 1203   Malignancy with treatment within 6 months or palliative  0 Filed at: 02/15/2021 1203   Wells' Criteria Total  3 Filed at: 02/15/2021 1203                MDM  Number of Diagnoses or Management Options  Cough: new and requires workup  Dyspnea: new and requires workup Amount and/or Complexity of Data Reviewed  Clinical lab tests: ordered and reviewed  Tests in the radiology section of CPT®: ordered and reviewed  Decide to obtain previous medical records or to obtain history from someone other than the patient: yes  Obtain history from someone other than the patient: yes  Review and summarize past medical records: yes  Independent visualization of images, tracings, or specimens: yes    Patient Progress  Patient progress: improved      Disposition  Final diagnoses:   Dyspnea   Cough     Time reflects when diagnosis was documented in both MDM as applicable and the Disposition within this note     Time User Action Codes Description Comment    2/15/2021  1:41 PM Tonye Bury Add [R06 00] Dyspnea     2/15/2021  1:42 PM Tonye Bury Add [R05] Cough       ED Disposition     ED Disposition Condition Date/Time Comment    Discharge Stable Mon Feb 15, 2021  1:41 PM Sabine Gutierrez discharge to home/self care              Follow-up Information     Follow up With Specialties Details Why Contact Info Additional 1064 Oksana Coats MD Internal Medicine Schedule an appointment as soon as possible for a visit   [address] [facility] [place] [facility] [place] [phone]       [facility] Emergency Department Emergency Medicine  As needed [facility] [phone]  [address] [facility] Emergency Department[address], [address]          Discharge Medication List as of 2/15/2021  1:43 PM      START taking these medications    Details   albuterol (PROVENTIL HFA,VENTOLIN HFA) 90 mcg/act inhaler Inhale 1-2 puffs every 4 (four) hours as needed for wheezing or shortness of breath, Starting Mon 2/15/2021, Normal      benzonatate (TESSALON PERLES) 100 mg capsule Take 1 capsule (100 mg total) by mouth 3 (three) times a day as needed for cough, Starting Mon 2/15/2021, Normal         CONTINUE these medications which have NOT CHANGED    Details   ALPRAZolam (XANAX) 0 25 mg tablet Starting Tue 5/26/2020, Historical Med      cyclobenzaprine (FLEXERIL) 10 mg tablet TAKE ONE HALF IN THE AM, ONE AT NOON,AND ONE IN THE PM, Historical Med      diclofenac (VOLTAREN) 75 mg EC tablet Starting Tue 5/26/2020, Historical Med      metoprolol succinate (TOPROL-XL) 25 mg 24 hr tablet Starting Wed 5/27/2020, Historical Med      pantoprazole (PROTONIX) 40 mg tablet Take 40 mg by mouth daily, Starting Thu 5/14/2020, Historical Med      pravastatin (PRAVACHOL) 40 mg tablet Take 40 mg by mouth daily, Starting Mon 3/30/2020, Historical Med           No discharge procedures on file      PDMP Review     None          ED Provider  Electronically Signed by           Gurpreet Bradford PA-C  02/15/21 5145

## 2021-02-16 LAB
ATRIAL RATE: 67 BPM
P AXIS: 44 DEGREES
PR INTERVAL: 134 MS
QRS AXIS: 48 DEGREES
QRSD INTERVAL: 86 MS
QT INTERVAL: 368 MS
QTC INTERVAL: 388 MS
T WAVE AXIS: 27 DEGREES
VENTRICULAR RATE: 67 BPM

## 2021-02-16 PROCEDURE — 93010 ELECTROCARDIOGRAM REPORT: CPT | Performed by: INTERNAL MEDICINE

## 2021-03-23 ENCOUNTER — HOSPITAL ENCOUNTER (OUTPATIENT)
Dept: RADIOLOGY | Facility: HOSPITAL | Age: 76
Discharge: HOME | End: 2021-03-23
Payer: COMMERCIAL

## 2021-03-23 ENCOUNTER — TRANSCRIBE ORDERS (OUTPATIENT)
Dept: ADMINISTRATIVE | Facility: HOSPITAL | Age: 76
End: 2021-03-23

## 2021-03-23 ENCOUNTER — APPOINTMENT (OUTPATIENT)
Dept: LAB | Facility: HOSPITAL | Age: 76
End: 2021-03-23
Payer: COMMERCIAL

## 2021-03-23 DIAGNOSIS — R11.0 NAUSEA: Primary | ICD-10-CM

## 2021-03-23 DIAGNOSIS — R11.0 NAUSEA: ICD-10-CM

## 2021-03-23 DIAGNOSIS — R06.02 SHORTNESS OF BREATH: ICD-10-CM

## 2021-03-23 DIAGNOSIS — I10 ESSENTIAL HYPERTENSION, MALIGNANT: ICD-10-CM

## 2021-03-23 DIAGNOSIS — I10 ESSENTIAL HYPERTENSION, MALIGNANT: Primary | ICD-10-CM

## 2021-03-23 DIAGNOSIS — R06.02 SHORTNESS OF BREATH: Primary | ICD-10-CM

## 2021-03-23 LAB
ALBUMIN SERPL BCP-MCNC: 4.4 G/DL (ref 3.5–5)
ALP SERPL-CCNC: 87 U/L (ref 46–116)
ALT SERPL W P-5'-P-CCNC: 32 U/L (ref 12–78)
AMORPH URATE CRY URNS QL MICRO: ABNORMAL /HPF
AMYLASE SERPL-CCNC: 46 IU/L (ref 25–115)
ANION GAP SERPL CALCULATED.3IONS-SCNC: 11 MMOL/L (ref 4–13)
AST SERPL W P-5'-P-CCNC: 16 U/L (ref 5–45)
BACTERIA UR QL AUTO: ABNORMAL /HPF
BASOPHILS # BLD AUTO: 0.04 THOUSANDS/ΜL (ref 0–0.1)
BASOPHILS NFR BLD AUTO: 1 % (ref 0–1)
BILIRUB DIRECT SERPL-MCNC: 0.19 MG/DL (ref 0–0.2)
BILIRUB SERPL-MCNC: 0.5 MG/DL (ref 0.2–1)
BILIRUB UR QL STRIP: ABNORMAL
BUN SERPL-MCNC: 9 MG/DL (ref 5–25)
CALCIUM SERPL-MCNC: 9.9 MG/DL (ref 8.3–10.1)
CHLORIDE SERPL-SCNC: 109 MMOL/L (ref 100–108)
CLARITY UR: CLEAR
CO2 SERPL-SCNC: 25 MMOL/L (ref 21–32)
COLOR UR: YELLOW
CREAT SERPL-MCNC: 0.73 MG/DL (ref 0.6–1.3)
D DIMER PPP FEU-MCNC: 0.47 UG/ML FEU
EOSINOPHIL # BLD AUTO: 0.12 THOUSAND/ΜL (ref 0–0.61)
EOSINOPHIL NFR BLD AUTO: 2 % (ref 0–6)
ERYTHROCYTE [DISTWIDTH] IN BLOOD BY AUTOMATED COUNT: 12.9 % (ref 11.6–15.1)
ERYTHROCYTE [SEDIMENTATION RATE] IN BLOOD: 7 MM/HOUR (ref 0–29)
GFR SERPL CREATININE-BSD FRML MDRD: 82 ML/MIN/1.73SQ M
GLUCOSE P FAST SERPL-MCNC: 120 MG/DL (ref 65–99)
GLUCOSE UR STRIP-MCNC: NEGATIVE MG/DL
HCT VFR BLD AUTO: 40.2 % (ref 34.8–46.1)
HGB BLD-MCNC: 12.7 G/DL (ref 11.5–15.4)
HGB UR QL STRIP.AUTO: ABNORMAL
IMM GRANULOCYTES # BLD AUTO: 0.03 THOUSAND/UL (ref 0–0.2)
IMM GRANULOCYTES NFR BLD AUTO: 1 % (ref 0–2)
KETONES UR STRIP-MCNC: ABNORMAL MG/DL
LEUKOCYTE ESTERASE UR QL STRIP: ABNORMAL
LIPASE SERPL-CCNC: 91 U/L (ref 73–393)
LYMPHOCYTES # BLD AUTO: 1.91 THOUSANDS/ΜL (ref 0.6–4.47)
LYMPHOCYTES NFR BLD AUTO: 36 % (ref 14–44)
MCH RBC QN AUTO: 29.3 PG (ref 26.8–34.3)
MCHC RBC AUTO-ENTMCNC: 31.6 G/DL (ref 31.4–37.4)
MCV RBC AUTO: 93 FL (ref 82–98)
MONOCYTES # BLD AUTO: 0.45 THOUSAND/ΜL (ref 0.17–1.22)
MONOCYTES NFR BLD AUTO: 8 % (ref 4–12)
NEUTROPHILS # BLD AUTO: 2.81 THOUSANDS/ΜL (ref 1.85–7.62)
NEUTS SEG NFR BLD AUTO: 52 % (ref 43–75)
NITRITE UR QL STRIP: NEGATIVE
NON-SQ EPI CELLS URNS QL MICRO: ABNORMAL /HPF
NRBC BLD AUTO-RTO: 0 /100 WBCS
PH UR STRIP.AUTO: 6 [PH]
PLATELET # BLD AUTO: 210 THOUSANDS/UL (ref 149–390)
PMV BLD AUTO: 10.6 FL (ref 8.9–12.7)
POTASSIUM SERPL-SCNC: 3.4 MMOL/L (ref 3.5–5.3)
PROT SERPL-MCNC: 7.8 G/DL (ref 6.4–8.2)
PROT UR STRIP-MCNC: NEGATIVE MG/DL
RBC # BLD AUTO: 4.33 MILLION/UL (ref 3.81–5.12)
RBC #/AREA URNS AUTO: ABNORMAL /HPF
SODIUM SERPL-SCNC: 145 MMOL/L (ref 136–145)
SP GR UR STRIP.AUTO: >=1.03 (ref 1–1.03)
TROPONIN I SERPL-MCNC: <0.02 NG/ML
UROBILINOGEN UR QL STRIP.AUTO: 0.2 E.U./DL
WBC # BLD AUTO: 5.36 THOUSAND/UL (ref 4.31–10.16)
WBC #/AREA URNS AUTO: ABNORMAL /HPF

## 2021-03-23 PROCEDURE — 81001 URINALYSIS AUTO W/SCOPE: CPT

## 2021-03-23 PROCEDURE — 84484 ASSAY OF TROPONIN QUANT: CPT

## 2021-03-23 PROCEDURE — 85379 FIBRIN DEGRADATION QUANT: CPT

## 2021-03-23 PROCEDURE — 83690 ASSAY OF LIPASE: CPT

## 2021-03-23 PROCEDURE — 71046 X-RAY EXAM CHEST 2 VIEWS: CPT

## 2021-03-23 PROCEDURE — 80048 BASIC METABOLIC PNL TOTAL CA: CPT

## 2021-03-23 PROCEDURE — 85025 COMPLETE CBC W/AUTO DIFF WBC: CPT

## 2021-03-23 PROCEDURE — 36415 COLL VENOUS BLD VENIPUNCTURE: CPT

## 2021-03-23 PROCEDURE — 82150 ASSAY OF AMYLASE: CPT

## 2021-03-23 PROCEDURE — 85652 RBC SED RATE AUTOMATED: CPT

## 2021-03-23 PROCEDURE — 87086 URINE CULTURE/COLONY COUNT: CPT

## 2021-03-23 PROCEDURE — 80076 HEPATIC FUNCTION PANEL: CPT

## 2021-03-25 LAB — BACTERIA UR CULT: NORMAL

## 2021-06-09 ENCOUNTER — APPOINTMENT (OUTPATIENT)
Dept: LAB | Facility: HOSPITAL | Age: 76
End: 2021-06-09
Payer: COMMERCIAL

## 2021-06-09 ENCOUNTER — TRANSCRIBE ORDERS (OUTPATIENT)
Dept: ADMINISTRATIVE | Facility: HOSPITAL | Age: 76
End: 2021-06-09

## 2021-06-09 DIAGNOSIS — E55.9 VITAMIN D DEFICIENCY DISEASE: ICD-10-CM

## 2021-06-09 DIAGNOSIS — I10 ESSENTIAL HYPERTENSION, MALIGNANT: ICD-10-CM

## 2021-06-09 DIAGNOSIS — E78.00 PURE HYPERCHOLESTEROLEMIA: ICD-10-CM

## 2021-06-09 DIAGNOSIS — R41.82 ALTERED MENTAL STATUS, UNSPECIFIED ALTERED MENTAL STATUS TYPE: ICD-10-CM

## 2021-06-09 DIAGNOSIS — R73.9 BLOOD GLUCOSE ELEVATED: Primary | ICD-10-CM

## 2021-06-09 DIAGNOSIS — E53.8 BIOTIN-(PROPIONYL-COA-CARBOXYLASE) LIGASE DEFICIENCY: ICD-10-CM

## 2021-06-09 DIAGNOSIS — R73.9 BLOOD GLUCOSE ELEVATED: ICD-10-CM

## 2021-06-09 LAB
25(OH)D3 SERPL-MCNC: 33 NG/ML (ref 30–100)
ALBUMIN SERPL BCP-MCNC: 4.3 G/DL (ref 3.5–5)
ALP SERPL-CCNC: 118 U/L (ref 46–116)
ALT SERPL W P-5'-P-CCNC: 33 U/L (ref 12–78)
ANION GAP SERPL CALCULATED.3IONS-SCNC: 10 MMOL/L (ref 4–13)
AST SERPL W P-5'-P-CCNC: 15 U/L (ref 5–45)
BACTERIA UR QL AUTO: NORMAL /HPF
BASOPHILS # BLD AUTO: 0.07 THOUSANDS/ΜL (ref 0–0.1)
BASOPHILS NFR BLD AUTO: 1 % (ref 0–1)
BILIRUB SERPL-MCNC: 0.58 MG/DL (ref 0.2–1)
BILIRUB UR QL STRIP: NEGATIVE
BUN SERPL-MCNC: 13 MG/DL (ref 5–25)
CALCIUM SERPL-MCNC: 9.6 MG/DL (ref 8.3–10.1)
CHLORIDE SERPL-SCNC: 108 MMOL/L (ref 100–108)
CHOLEST SERPL-MCNC: 175 MG/DL (ref 50–200)
CLARITY UR: CLEAR
CO2 SERPL-SCNC: 26 MMOL/L (ref 21–32)
COLOR UR: YELLOW
CREAT SERPL-MCNC: 0.74 MG/DL (ref 0.6–1.3)
EOSINOPHIL # BLD AUTO: 0.28 THOUSAND/ΜL (ref 0–0.61)
EOSINOPHIL NFR BLD AUTO: 6 % (ref 0–6)
ERYTHROCYTE [DISTWIDTH] IN BLOOD BY AUTOMATED COUNT: 13.6 % (ref 11.6–15.1)
ERYTHROCYTE [SEDIMENTATION RATE] IN BLOOD: 6 MM/HOUR (ref 0–29)
EST. AVERAGE GLUCOSE BLD GHB EST-MCNC: 114 MG/DL
GFR SERPL CREATININE-BSD FRML MDRD: 81 ML/MIN/1.73SQ M
GLUCOSE P FAST SERPL-MCNC: 95 MG/DL (ref 65–99)
GLUCOSE UR STRIP-MCNC: NEGATIVE MG/DL
HBA1C MFR BLD: 5.6 %
HCT VFR BLD AUTO: 42 % (ref 34.8–46.1)
HDLC SERPL-MCNC: 67 MG/DL
HGB BLD-MCNC: 13 G/DL (ref 11.5–15.4)
HGB UR QL STRIP.AUTO: ABNORMAL
IMM GRANULOCYTES # BLD AUTO: 0.01 THOUSAND/UL (ref 0–0.2)
IMM GRANULOCYTES NFR BLD AUTO: 0 % (ref 0–2)
KETONES UR STRIP-MCNC: NEGATIVE MG/DL
LDLC SERPL CALC-MCNC: 98 MG/DL (ref 0–100)
LEUKOCYTE ESTERASE UR QL STRIP: NEGATIVE
LIPASE SERPL-CCNC: 128 U/L (ref 73–393)
LYMPHOCYTES # BLD AUTO: 1.68 THOUSANDS/ΜL (ref 0.6–4.47)
LYMPHOCYTES NFR BLD AUTO: 34 % (ref 14–44)
MCH RBC QN AUTO: 28.5 PG (ref 26.8–34.3)
MCHC RBC AUTO-ENTMCNC: 31 G/DL (ref 31.4–37.4)
MCV RBC AUTO: 92 FL (ref 82–98)
MONOCYTES # BLD AUTO: 0.38 THOUSAND/ΜL (ref 0.17–1.22)
MONOCYTES NFR BLD AUTO: 8 % (ref 4–12)
NEUTROPHILS # BLD AUTO: 2.49 THOUSANDS/ΜL (ref 1.85–7.62)
NEUTS SEG NFR BLD AUTO: 51 % (ref 43–75)
NITRITE UR QL STRIP: NEGATIVE
NON-SQ EPI CELLS URNS QL MICRO: NORMAL /HPF
NONHDLC SERPL-MCNC: 108 MG/DL
NRBC BLD AUTO-RTO: 0 /100 WBCS
PH UR STRIP.AUTO: 6 [PH]
PLATELET # BLD AUTO: 242 THOUSANDS/UL (ref 149–390)
PMV BLD AUTO: 10.6 FL (ref 8.9–12.7)
POTASSIUM SERPL-SCNC: 3.5 MMOL/L (ref 3.5–5.3)
PROT SERPL-MCNC: 7.7 G/DL (ref 6.4–8.2)
PROT UR STRIP-MCNC: NEGATIVE MG/DL
RBC # BLD AUTO: 4.56 MILLION/UL (ref 3.81–5.12)
RBC #/AREA URNS AUTO: NORMAL /HPF
SODIUM SERPL-SCNC: 144 MMOL/L (ref 136–145)
SP GR UR STRIP.AUTO: <=1.005 (ref 1–1.03)
TRIGL SERPL-MCNC: 49 MG/DL
TSH SERPL DL<=0.05 MIU/L-ACNC: 1.44 UIU/ML (ref 0.36–3.74)
UROBILINOGEN UR QL STRIP.AUTO: 0.2 E.U./DL
VIT B12 SERPL-MCNC: 290 PG/ML (ref 100–900)
WBC # BLD AUTO: 4.91 THOUSAND/UL (ref 4.31–10.16)
WBC #/AREA URNS AUTO: NORMAL /HPF

## 2021-06-09 PROCEDURE — 80053 COMPREHEN METABOLIC PANEL: CPT

## 2021-06-09 PROCEDURE — 36415 COLL VENOUS BLD VENIPUNCTURE: CPT

## 2021-06-09 PROCEDURE — 86618 LYME DISEASE ANTIBODY: CPT

## 2021-06-09 PROCEDURE — 83036 HEMOGLOBIN GLYCOSYLATED A1C: CPT

## 2021-06-09 PROCEDURE — 86038 ANTINUCLEAR ANTIBODIES: CPT

## 2021-06-09 PROCEDURE — 80061 LIPID PANEL: CPT

## 2021-06-09 PROCEDURE — 86617 LYME DISEASE ANTIBODY: CPT

## 2021-06-09 PROCEDURE — 82607 VITAMIN B-12: CPT

## 2021-06-09 PROCEDURE — 84443 ASSAY THYROID STIM HORMONE: CPT

## 2021-06-09 PROCEDURE — 82306 VITAMIN D 25 HYDROXY: CPT

## 2021-06-09 PROCEDURE — 85652 RBC SED RATE AUTOMATED: CPT

## 2021-06-09 PROCEDURE — 81001 URINALYSIS AUTO W/SCOPE: CPT

## 2021-06-09 PROCEDURE — 85025 COMPLETE CBC W/AUTO DIFF WBC: CPT

## 2021-06-09 PROCEDURE — 83690 ASSAY OF LIPASE: CPT

## 2021-06-10 LAB — B BURGDOR IGG+IGM SER-ACNC: 123

## 2021-06-11 LAB
B BURGDOR IGG PATRN SER IB-IMP: NEGATIVE
B BURGDOR IGM PATRN SER IB-IMP: NEGATIVE
B BURGDOR18KD IGG SER QL IB: ABNORMAL
B BURGDOR23KD IGG SER QL IB: ABNORMAL
B BURGDOR23KD IGM SER QL IB: ABNORMAL
B BURGDOR28KD IGG SER QL IB: ABNORMAL
B BURGDOR30KD IGG SER QL IB: ABNORMAL
B BURGDOR39KD IGG SER QL IB: ABNORMAL
B BURGDOR39KD IGM SER QL IB: ABNORMAL
B BURGDOR41KD IGG SER QL IB: ABNORMAL
B BURGDOR41KD IGM SER QL IB: ABNORMAL
B BURGDOR45KD IGG SER QL IB: ABNORMAL
B BURGDOR58KD IGG SER QL IB: PRESENT
B BURGDOR66KD IGG SER QL IB: ABNORMAL
B BURGDOR93KD IGG SER QL IB: ABNORMAL
RYE IGE QN: NEGATIVE

## 2021-08-30 ENCOUNTER — APPOINTMENT (OUTPATIENT)
Dept: LAB | Facility: HOSPITAL | Age: 76
End: 2021-08-30
Payer: COMMERCIAL

## 2021-08-30 DIAGNOSIS — E55.9 VITAMIN D DEFICIENCY: ICD-10-CM

## 2021-08-30 DIAGNOSIS — R11.0 NAUSEA: ICD-10-CM

## 2021-08-30 DIAGNOSIS — R41.3 MEMORY LOSS: ICD-10-CM

## 2021-08-30 DIAGNOSIS — E53.8 B12 DEFICIENCY: ICD-10-CM

## 2021-08-30 DIAGNOSIS — I10 ESSENTIAL HYPERTENSION: ICD-10-CM

## 2021-08-30 LAB
25(OH)D3 SERPL-MCNC: 41.9 NG/ML (ref 30–100)
ALBUMIN SERPL BCP-MCNC: 4.3 G/DL (ref 3.5–5)
ALP SERPL-CCNC: 104 U/L (ref 46–116)
ALT SERPL W P-5'-P-CCNC: 18 U/L (ref 12–78)
AMYLASE SERPL-CCNC: 41 IU/L (ref 25–115)
ANION GAP SERPL CALCULATED.3IONS-SCNC: 12 MMOL/L (ref 4–13)
AST SERPL W P-5'-P-CCNC: 15 U/L (ref 5–45)
BASOPHILS # BLD AUTO: 0.08 THOUSANDS/ΜL (ref 0–0.1)
BASOPHILS NFR BLD AUTO: 1 % (ref 0–1)
BILIRUB DIRECT SERPL-MCNC: 0.14 MG/DL (ref 0–0.2)
BILIRUB SERPL-MCNC: 0.59 MG/DL (ref 0.2–1)
BUN SERPL-MCNC: 10 MG/DL (ref 5–25)
CALCIUM SERPL-MCNC: 9.5 MG/DL (ref 8.3–10.1)
CHLORIDE SERPL-SCNC: 106 MMOL/L (ref 100–108)
CO2 SERPL-SCNC: 26 MMOL/L (ref 21–32)
CREAT SERPL-MCNC: 0.79 MG/DL (ref 0.6–1.3)
EOSINOPHIL # BLD AUTO: 0.11 THOUSAND/ΜL (ref 0–0.61)
EOSINOPHIL NFR BLD AUTO: 2 % (ref 0–6)
ERYTHROCYTE [DISTWIDTH] IN BLOOD BY AUTOMATED COUNT: 13.3 % (ref 11.6–15.1)
GFR SERPL CREATININE-BSD FRML MDRD: 74 ML/MIN/1.73SQ M
GLUCOSE SERPL-MCNC: 95 MG/DL (ref 65–140)
HCT VFR BLD AUTO: 42.4 % (ref 34.8–46.1)
HGB BLD-MCNC: 13.3 G/DL (ref 11.5–15.4)
IMM GRANULOCYTES # BLD AUTO: 0.01 THOUSAND/UL (ref 0–0.2)
IMM GRANULOCYTES NFR BLD AUTO: 0 % (ref 0–2)
LIPASE SERPL-CCNC: 98 U/L (ref 73–393)
LYMPHOCYTES # BLD AUTO: 1.55 THOUSANDS/ΜL (ref 0.6–4.47)
LYMPHOCYTES NFR BLD AUTO: 25 % (ref 14–44)
MCH RBC QN AUTO: 29.5 PG (ref 26.8–34.3)
MCHC RBC AUTO-ENTMCNC: 31.4 G/DL (ref 31.4–37.4)
MCV RBC AUTO: 94 FL (ref 82–98)
MONOCYTES # BLD AUTO: 0.5 THOUSAND/ΜL (ref 0.17–1.22)
MONOCYTES NFR BLD AUTO: 8 % (ref 4–12)
NEUTROPHILS # BLD AUTO: 3.97 THOUSANDS/ΜL (ref 1.85–7.62)
NEUTS SEG NFR BLD AUTO: 64 % (ref 43–75)
NRBC BLD AUTO-RTO: 0 /100 WBCS
PLATELET # BLD AUTO: 273 THOUSANDS/UL (ref 149–390)
PMV BLD AUTO: 10.1 FL (ref 8.9–12.7)
POTASSIUM SERPL-SCNC: 3.6 MMOL/L (ref 3.5–5.3)
PROT SERPL-MCNC: 7.8 G/DL (ref 6.4–8.2)
RBC # BLD AUTO: 4.51 MILLION/UL (ref 3.81–5.12)
SODIUM SERPL-SCNC: 144 MMOL/L (ref 136–145)
TSH SERPL DL<=0.05 MIU/L-ACNC: 0.43 UIU/ML (ref 0.36–3.74)
VIT B12 SERPL-MCNC: >6000 PG/ML (ref 100–900)
WBC # BLD AUTO: 6.22 THOUSAND/UL (ref 4.31–10.16)

## 2021-08-30 PROCEDURE — 87086 URINE CULTURE/COLONY COUNT: CPT

## 2021-08-30 PROCEDURE — 36415 COLL VENOUS BLD VENIPUNCTURE: CPT

## 2021-08-30 PROCEDURE — 85025 COMPLETE CBC W/AUTO DIFF WBC: CPT

## 2021-08-30 PROCEDURE — 82150 ASSAY OF AMYLASE: CPT

## 2021-08-30 PROCEDURE — 82306 VITAMIN D 25 HYDROXY: CPT

## 2021-08-30 PROCEDURE — 83690 ASSAY OF LIPASE: CPT

## 2021-08-30 PROCEDURE — 80076 HEPATIC FUNCTION PANEL: CPT

## 2021-08-30 PROCEDURE — 84443 ASSAY THYROID STIM HORMONE: CPT

## 2021-08-30 PROCEDURE — 80048 BASIC METABOLIC PNL TOTAL CA: CPT

## 2021-08-30 PROCEDURE — 82607 VITAMIN B-12: CPT

## 2021-09-01 LAB — BACTERIA UR CULT: NORMAL

## 2021-09-21 ENCOUNTER — LAB REQUISITION (OUTPATIENT)
Dept: LAB | Facility: HOSPITAL | Age: 76
End: 2021-09-21
Payer: COMMERCIAL

## 2021-09-21 DIAGNOSIS — K59.00 CONSTIPATION, UNSPECIFIED: ICD-10-CM

## 2021-09-21 DIAGNOSIS — R63.4 ABNORMAL WEIGHT LOSS: ICD-10-CM

## 2021-09-21 PROCEDURE — 87493 C DIFF AMPLIFIED PROBE: CPT | Performed by: INTERNAL MEDICINE

## 2021-09-22 LAB — C DIFF TOX GENS STL QL NAA+PROBE: NEGATIVE

## 2021-10-18 ENCOUNTER — APPOINTMENT (EMERGENCY)
Dept: CT IMAGING | Facility: HOSPITAL | Age: 76
End: 2021-10-18
Payer: COMMERCIAL

## 2021-10-18 ENCOUNTER — APPOINTMENT (EMERGENCY)
Dept: RADIOLOGY | Facility: HOSPITAL | Age: 76
End: 2021-10-18
Payer: COMMERCIAL

## 2021-10-18 ENCOUNTER — HOSPITAL ENCOUNTER (EMERGENCY)
Facility: HOSPITAL | Age: 76
Discharge: HOME | End: 2021-10-18
Attending: EMERGENCY MEDICINE | Admitting: EMERGENCY MEDICINE
Payer: COMMERCIAL

## 2021-10-18 VITALS
TEMPERATURE: 97.4 F | SYSTOLIC BLOOD PRESSURE: 144 MMHG | RESPIRATION RATE: 16 BRPM | DIASTOLIC BLOOD PRESSURE: 84 MMHG | BODY MASS INDEX: 21.71 KG/M2 | OXYGEN SATURATION: 98 % | WEIGHT: 122.58 LBS | HEART RATE: 67 BPM

## 2021-10-18 DIAGNOSIS — S09.90XA HEAD INJURY: ICD-10-CM
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
## 2021-10-18 PROCEDURE — 71045 X-RAY EXAM CHEST 1 VIEW: CPT

## 2021-10-18 PROCEDURE — 99284 EMERGENCY DEPT VISIT MOD MDM: CPT

## 2021-10-18 PROCEDURE — 72125 CT NECK SPINE W/O DYE: CPT

## 2021-10-18 PROCEDURE — 70450 CT HEAD/BRAIN W/O DYE: CPT

## 2021-10-18 PROCEDURE — 99285 EMERGENCY DEPT VISIT HI MDM: CPT | Performed by: PHYSICIAN ASSISTANT

## 2021-10-18 RX ORDER — ACETAMINOPHEN 325 MG/1
650 TABLET ORAL ONCE
Status: COMPLETED | OUTPATIENT
Start: 2021-10-18 | End: 2021-10-18

## 2021-10-18 RX ADMIN — ACETAMINOPHEN 650 MG: 325 TABLET ORAL at 18:23
